# Patient Record
Sex: MALE | Race: WHITE | NOT HISPANIC OR LATINO | Employment: OTHER | URBAN - METROPOLITAN AREA
[De-identification: names, ages, dates, MRNs, and addresses within clinical notes are randomized per-mention and may not be internally consistent; named-entity substitution may affect disease eponyms.]

---

## 2023-07-24 ENCOUNTER — APPOINTMENT (OUTPATIENT)
Dept: PRIMARY CARE | Facility: CLINIC | Age: 68
End: 2023-07-24
Payer: COMMERCIAL

## 2023-08-10 DIAGNOSIS — E78.5 HYPERLIPIDEMIA, UNSPECIFIED HYPERLIPIDEMIA TYPE: ICD-10-CM

## 2023-08-10 DIAGNOSIS — I10 ESSENTIAL (PRIMARY) HYPERTENSION: ICD-10-CM

## 2023-08-10 RX ORDER — ROSUVASTATIN CALCIUM 40 MG/1
40 TABLET, COATED ORAL DAILY
COMMUNITY
End: 2023-08-10 | Stop reason: SDUPTHER

## 2023-08-10 RX ORDER — BENAZEPRIL HYDROCHLORIDE 20 MG/1
20 TABLET ORAL DAILY
Qty: 90 TABLET | Refills: 0 | Status: SHIPPED | OUTPATIENT
Start: 2023-08-10 | End: 2023-09-07 | Stop reason: SDUPTHER

## 2023-08-10 RX ORDER — ROSUVASTATIN CALCIUM 40 MG/1
40 TABLET, COATED ORAL DAILY
Qty: 90 TABLET | Refills: 0 | Status: SHIPPED | OUTPATIENT
Start: 2023-08-10 | End: 2024-01-02

## 2023-09-06 ENCOUNTER — TELEPHONE (OUTPATIENT)
Dept: PRIMARY CARE | Facility: CLINIC | Age: 68
End: 2023-09-06
Payer: COMMERCIAL

## 2023-09-06 DIAGNOSIS — I10 ESSENTIAL (PRIMARY) HYPERTENSION: ICD-10-CM

## 2023-09-07 RX ORDER — BENAZEPRIL HYDROCHLORIDE 20 MG/1
20 TABLET ORAL DAILY
Qty: 90 TABLET | Refills: 0 | Status: SHIPPED | OUTPATIENT
Start: 2023-09-07 | End: 2024-02-29

## 2023-09-16 LAB — URINE CULTURE: ABNORMAL

## 2023-12-13 ENCOUNTER — OFFICE VISIT (OUTPATIENT)
Dept: URGENT CARE | Facility: CLINIC | Age: 68
End: 2023-12-13
Payer: COMMERCIAL

## 2023-12-13 VITALS
TEMPERATURE: 97.7 F | HEART RATE: 78 BPM | BODY MASS INDEX: 25.1 KG/M2 | SYSTOLIC BLOOD PRESSURE: 168 MMHG | DIASTOLIC BLOOD PRESSURE: 78 MMHG | RESPIRATION RATE: 20 BRPM | OXYGEN SATURATION: 96 % | WEIGHT: 170 LBS

## 2023-12-13 DIAGNOSIS — S01.01XA SCALP LACERATION, INITIAL ENCOUNTER: Primary | ICD-10-CM

## 2023-12-13 PROCEDURE — 3077F SYST BP >= 140 MM HG: CPT | Performed by: PHYSICIAN ASSISTANT

## 2023-12-13 PROCEDURE — 99204 OFFICE O/P NEW MOD 45 MIN: CPT | Performed by: PHYSICIAN ASSISTANT

## 2023-12-13 PROCEDURE — 90471 IMMUNIZATION ADMIN: CPT | Performed by: PHYSICIAN ASSISTANT

## 2023-12-13 PROCEDURE — 3078F DIAST BP <80 MM HG: CPT | Performed by: PHYSICIAN ASSISTANT

## 2023-12-13 PROCEDURE — 90714 TD VACC NO PRESV 7 YRS+ IM: CPT | Performed by: PHYSICIAN ASSISTANT

## 2023-12-13 PROCEDURE — 1125F AMNT PAIN NOTED PAIN PRSNT: CPT | Performed by: PHYSICIAN ASSISTANT

## 2023-12-13 PROCEDURE — 1159F MED LIST DOCD IN RCRD: CPT | Performed by: PHYSICIAN ASSISTANT

## 2023-12-13 RX ORDER — TAMSULOSIN HYDROCHLORIDE 0.4 MG/1
0.4 CAPSULE ORAL 2 TIMES DAILY
COMMUNITY

## 2023-12-13 RX ORDER — CILOSTAZOL 100 MG/1
TABLET ORAL
COMMUNITY
Start: 2017-08-09

## 2023-12-13 RX ORDER — WARFARIN 7.5 MG/1
TABLET ORAL
COMMUNITY
Start: 2015-09-03

## 2023-12-13 ASSESSMENT — ENCOUNTER SYMPTOMS
ALLERGIC/IMMUNOLOGIC NEGATIVE: 1
HEADACHES: 0
ENDOCRINE NEGATIVE: 1
RESPIRATORY NEGATIVE: 1
CARDIOVASCULAR NEGATIVE: 1
WOUND: 1
PSYCHIATRIC NEGATIVE: 1
HEMATOLOGIC/LYMPHATIC NEGATIVE: 1
CONSTITUTIONAL NEGATIVE: 1
GASTROINTESTINAL NEGATIVE: 1
NECK PAIN: 0

## 2023-12-13 ASSESSMENT — PAIN SCALES - GENERAL: PAINLEVEL: 2

## 2023-12-14 NOTE — PROGRESS NOTES
Subjective   Patient ID: Vijay Juarez is a 68 y.o. male.      History provided by:  Patient and spouse   used: No    Laceration  This is a 68 yr old male here for scalp lacerations. Pt states under his desk tonight and started to get up hitting his head on sharp object under the desk. No LOC, vision change, neck pain or HA. On coumadin. Last tetanus unknown.      Review of Systems   Constitutional: Negative.    HENT: Negative.     Eyes:  Negative for visual disturbance.   Respiratory: Negative.     Cardiovascular: Negative.    Gastrointestinal: Negative.    Endocrine: Negative.    Genitourinary: Negative.    Musculoskeletal:  Negative for neck pain.   Skin:  Positive for wound.   Allergic/Immunologic: Negative.    Neurological:  Negative for headaches.   Hematological: Negative.    Psychiatric/Behavioral: Negative.     All other systems reviewed and are negative.    Past Medical History:   Diagnosis Date    Other pericardial effusion (noninflammatory) 11/18/2021    Pericardial effusion    Personal history of other diseases of male genital organs 11/18/2021    History of erectile dysfunction    Personal history of other diseases of the circulatory system 07/31/2019    History of lymphadenitis    Personal history of other diseases of the musculoskeletal system and connective tissue 07/23/2019    History of arthritis    Personal history of other diseases of the respiratory system 02/01/2021    History of allergic rhinitis    Personal history of other diseases of the respiratory system 03/02/2020    History of sore throat    Personal history of other diseases of urinary system 10/14/2019    History of hematuria    Personal history of other endocrine, nutritional and metabolic disease 03/02/2020    History of vitamin D deficiency    Personal history of other infectious and parasitic diseases 11/07/2022    History of candidiasis of mouth     Current Outpatient Medications on File Prior to Visit    Medication Sig Dispense Refill    cilostazol (Pletal) 100 mg tablet Take by mouth.      warfarin (Coumadin) 7.5 mg tablet Take by mouth.      Anoro Ellipta 62.5-25 mcg/actuation blister with device TAKE 1 PUFF BY MOUTH EVERY DAY 60 each 6    benazepril (Lotensin) 20 mg tablet Take 1 tablet (20 mg) by mouth once daily. 90 tablet 0    rosuvastatin (Crestor) 40 mg tablet Take 1 tablet (40 mg) by mouth once daily. 90 tablet 0    tamsulosin (Flomax) 0.4 mg 24 hr capsule Take 1 capsule (0.4 mg) by mouth 2 times a day.       No current facility-administered medications on file prior to visit.     No Known Allergies  /78   Pulse 78   Temp 36.5 °C (97.7 °F)   Resp 20   Wt 77.1 kg (170 lb)   SpO2 96%   BMI 25.10 kg/m²   Objective   Physical Exam  Vitals and nursing note reviewed.   Constitutional:       Appearance: Normal appearance.   HENT:      Head: Normocephalic.      Comments: 2 superficial flap like lacerations top of scalp. No deep lacerations  Cardiovascular:      Rate and Rhythm: Normal rate and regular rhythm.   Pulmonary:      Effort: Pulmonary effort is normal.      Breath sounds: Normal breath sounds.   Skin:     General: Skin is warm and dry.   Neurological:      General: No focal deficit present.      Mental Status: He is alert and oriented to person, place, and time.   Psychiatric:         Mood and Affect: Mood normal.         Behavior: Behavior normal.     Procedure scalp laceration repair- wounds cleansed with betadine and sterile saline. Dermabond used to approximate wound edges. Hemostasis achieved. Pt tolerated procedure well.    Assessment:  Scalp laceration    Plan:  Td updated today  Keep wound clean and dry  No soap, water or antibiotic cream on the wounds  Dermabond will slough off on its own over the next week  Pcp follow up this week if not improving or worsening  ER visit any time 24/7 for acute worsening or changing condition

## 2023-12-14 NOTE — PATIENT INSTRUCTIONS
Keep wound site clean and dry  No soap, water or antibiotic cream on the wound site.   The dermabond will slough off on its own over the next week  Tylenol as directed for pain  Pcp follow up this week if not improving or worsening  ER visit anytime 24/7 for acute worsening or changing condition

## 2023-12-19 ENCOUNTER — OFFICE VISIT (OUTPATIENT)
Dept: PRIMARY CARE | Facility: CLINIC | Age: 68
End: 2023-12-19
Payer: COMMERCIAL

## 2023-12-19 ENCOUNTER — LAB (OUTPATIENT)
Dept: LAB | Facility: LAB | Age: 68
End: 2023-12-19
Payer: COMMERCIAL

## 2023-12-19 VITALS
SYSTOLIC BLOOD PRESSURE: 126 MMHG | TEMPERATURE: 97.3 F | BODY MASS INDEX: 24.68 KG/M2 | WEIGHT: 166.6 LBS | OXYGEN SATURATION: 97 % | HEART RATE: 93 BPM | DIASTOLIC BLOOD PRESSURE: 72 MMHG | HEIGHT: 69 IN

## 2023-12-19 DIAGNOSIS — R73.9 HYPERGLYCEMIA: ICD-10-CM

## 2023-12-19 DIAGNOSIS — E78.2 MIXED HYPERLIPIDEMIA: ICD-10-CM

## 2023-12-19 DIAGNOSIS — Z13.6 SCREENING FOR ABDOMINAL AORTIC ANEURYSM: ICD-10-CM

## 2023-12-19 DIAGNOSIS — I10 HYPERTENSION, UNSPECIFIED TYPE: ICD-10-CM

## 2023-12-19 DIAGNOSIS — S09.90XD INJURY OF HEAD, SUBSEQUENT ENCOUNTER: ICD-10-CM

## 2023-12-19 DIAGNOSIS — I10 BENIGN ESSENTIAL HYPERTENSION: ICD-10-CM

## 2023-12-19 DIAGNOSIS — I73.9 PAD (PERIPHERAL ARTERY DISEASE) (CMS-HCC): ICD-10-CM

## 2023-12-19 DIAGNOSIS — Z12.2 ENCOUNTER FOR SCREENING FOR LUNG CANCER: ICD-10-CM

## 2023-12-19 DIAGNOSIS — Z00.00 MEDICARE ANNUAL WELLNESS VISIT, SUBSEQUENT: ICD-10-CM

## 2023-12-19 DIAGNOSIS — N40.0 BENIGN PROSTATIC HYPERPLASIA WITHOUT LOWER URINARY TRACT SYMPTOMS: ICD-10-CM

## 2023-12-19 DIAGNOSIS — Z00.00 MEDICARE ANNUAL WELLNESS VISIT, SUBSEQUENT: Primary | ICD-10-CM

## 2023-12-19 DIAGNOSIS — F17.200 SMOKER: ICD-10-CM

## 2023-12-19 DIAGNOSIS — Z87.891 EX-CIGARETTE SMOKER: ICD-10-CM

## 2023-12-19 PROBLEM — S09.90XA HEAD INJURY: Status: ACTIVE | Noted: 2023-12-19

## 2023-12-19 PROCEDURE — 82043 UR ALBUMIN QUANTITATIVE: CPT

## 2023-12-19 PROCEDURE — 1125F AMNT PAIN NOTED PAIN PRSNT: CPT | Performed by: INTERNAL MEDICINE

## 2023-12-19 PROCEDURE — 1170F FXNL STATUS ASSESSED: CPT | Performed by: INTERNAL MEDICINE

## 2023-12-19 PROCEDURE — 3078F DIAST BP <80 MM HG: CPT | Performed by: INTERNAL MEDICINE

## 2023-12-19 PROCEDURE — 83036 HEMOGLOBIN GLYCOSYLATED A1C: CPT

## 2023-12-19 PROCEDURE — G0439 PPPS, SUBSEQ VISIT: HCPCS | Performed by: INTERNAL MEDICINE

## 2023-12-19 PROCEDURE — 84153 ASSAY OF PSA TOTAL: CPT

## 2023-12-19 PROCEDURE — 99497 ADVNCD CARE PLAN 30 MIN: CPT | Performed by: INTERNAL MEDICINE

## 2023-12-19 PROCEDURE — 1160F RVW MEDS BY RX/DR IN RCRD: CPT | Performed by: INTERNAL MEDICINE

## 2023-12-19 PROCEDURE — 84443 ASSAY THYROID STIM HORMONE: CPT

## 2023-12-19 PROCEDURE — 86803 HEPATITIS C AB TEST: CPT

## 2023-12-19 PROCEDURE — 81001 URINALYSIS AUTO W/SCOPE: CPT

## 2023-12-19 PROCEDURE — 3074F SYST BP LT 130 MM HG: CPT | Performed by: INTERNAL MEDICINE

## 2023-12-19 PROCEDURE — 80061 LIPID PANEL: CPT

## 2023-12-19 PROCEDURE — 85025 COMPLETE CBC W/AUTO DIFF WBC: CPT

## 2023-12-19 PROCEDURE — 84439 ASSAY OF FREE THYROXINE: CPT

## 2023-12-19 PROCEDURE — 80053 COMPREHEN METABOLIC PANEL: CPT

## 2023-12-19 PROCEDURE — 84550 ASSAY OF BLOOD/URIC ACID: CPT

## 2023-12-19 PROCEDURE — 1159F MED LIST DOCD IN RCRD: CPT | Performed by: INTERNAL MEDICINE

## 2023-12-19 PROCEDURE — G0296 VISIT TO DETERM LDCT ELIG: HCPCS | Performed by: INTERNAL MEDICINE

## 2023-12-19 PROCEDURE — 82570 ASSAY OF URINE CREATININE: CPT

## 2023-12-19 PROCEDURE — 36415 COLL VENOUS BLD VENIPUNCTURE: CPT

## 2023-12-19 ASSESSMENT — ACTIVITIES OF DAILY LIVING (ADL)
BATHING: INDEPENDENT
DRESSING: INDEPENDENT
TAKING_MEDICATION: INDEPENDENT
GROCERY_SHOPPING: INDEPENDENT
DOING_HOUSEWORK: INDEPENDENT
MANAGING_FINANCES: INDEPENDENT

## 2023-12-19 ASSESSMENT — PATIENT HEALTH QUESTIONNAIRE - PHQ9
SUM OF ALL RESPONSES TO PHQ9 QUESTIONS 1 AND 2: 0
2. FEELING DOWN, DEPRESSED OR HOPELESS: NOT AT ALL
2. FEELING DOWN, DEPRESSED OR HOPELESS: NOT AT ALL
SUM OF ALL RESPONSES TO PHQ9 QUESTIONS 1 AND 2: 0
1. LITTLE INTEREST OR PLEASURE IN DOING THINGS: NOT AT ALL
1. LITTLE INTEREST OR PLEASURE IN DOING THINGS: NOT AT ALL

## 2023-12-19 NOTE — PROGRESS NOTES
Assessment and Plan:  Problem List Items Addressed This Visit       Hypertension    Relevant Orders    Albumin , Urine Random    CBC and Auto Differential    Comprehensive Metabolic Panel    Hemoglobin A1C    Lipid Panel    Prostate Specific Antigen, Screen    TSH with reflex to Free T4 if abnormal    Uric Acid    Microscopic Only, Urine    CT cardiac scoring wo IV contrast    Colonoscopy Screening; Average Risk Patient    Hepatitis C antibody    Aorta iliac duplex limited    Hyperlipidemia    Relevant Orders    Albumin , Urine Random    CBC and Auto Differential    Comprehensive Metabolic Panel    Hemoglobin A1C    Lipid Panel    Prostate Specific Antigen, Screen    TSH with reflex to Free T4 if abnormal    Uric Acid    Microscopic Only, Urine    Colonoscopy Screening; Average Risk Patient    Hepatitis C antibody    Aorta iliac duplex limited    Medicare annual wellness visit, subsequent - Primary    Relevant Orders    Albumin , Urine Random    CBC and Auto Differential    Comprehensive Metabolic Panel    Hemoglobin A1C    Lipid Panel    Prostate Specific Antigen, Screen    TSH with reflex to Free T4 if abnormal    Uric Acid    Microscopic Only, Urine    Colonoscopy Screening; Average Risk Patient    Hepatitis C antibody    Aorta iliac duplex limited    PAD (peripheral artery disease) (CMS/HCC)    Relevant Orders    Albumin , Urine Random    CBC and Auto Differential    Comprehensive Metabolic Panel    Hemoglobin A1C    Lipid Panel    Prostate Specific Antigen, Screen    TSH with reflex to Free T4 if abnormal    Uric Acid    Microscopic Only, Urine    Colonoscopy Screening; Average Risk Patient    Hepatitis C antibody    Aorta iliac duplex limited    Smoker    Relevant Orders    Albumin , Urine Random    CBC and Auto Differential    Comprehensive Metabolic Panel    Hemoglobin A1C    Lipid Panel    Prostate Specific Antigen, Screen    TSH with reflex to Free T4 if abnormal    Uric Acid    Microscopic Only, Urine     Colonoscopy Screening; Average Risk Patient    Hepatitis C antibody    Aorta iliac duplex limited    Head injury    Relevant Orders    Albumin , Urine Random    CBC and Auto Differential    Comprehensive Metabolic Panel    Hemoglobin A1C    Lipid Panel    Prostate Specific Antigen, Screen    TSH with reflex to Free T4 if abnormal    Uric Acid    Microscopic Only, Urine    Colonoscopy Screening; Average Risk Patient    Hepatitis C antibody    Aorta iliac duplex limited     Other Visit Diagnoses       Ex-cigarette smoker        Relevant Orders    CT lung screening low dose    Hyperglycemia        Relevant Orders    Hemoglobin A1C    Benign prostatic hyperplasia without lower urinary tract symptoms        Relevant Orders    Prostate Specific Antigen, Screen              Chief Complaint:   Annual Medicare Wellness Office Exam/Comprehensive Problem Focused Follow Up and Physical Exam      HPI: 68-year-old patient  with no children    No siblings    Mother  from heart problem Father  from old age    History of the cataract surgery peripheral arterial disease left lower extremity bypass surgery on anticoagulation    History of hypertension hyperlipidemia BPH smoker induced bronchitis and PAD CAD    Negative for hemoptysis hematuria rectal bleeding    Do have some claudication seen by vascular    Negative for weight loss    Recent history of the head injury excellently went to urgent care had a glue placement no fever no chills no positive chills no headache no dizziness no seizure    Patient Active Problem List:  Patient Active Problem List   Diagnosis    Hypertension    Hyperlipidemia    Medicare annual wellness visit, subsequent    PAD (peripheral artery disease) (CMS/[facility])    Smoker    Head injury          Comprehensive Medical/Surgical/Social/Family History:  Family History   Problem Relation Name Age of Onset    Heart disease Mother      Other (back issues) Mother      No Known Problems Father          Past Medical History:   Diagnosis Date    Other pericardial effusion (noninflammatory) 11/18/2021    Pericardial effusion    Personal history of other diseases of male genital organs 11/18/2021    History of erectile dysfunction    Personal history of other diseases of the circulatory system 07/31/2019    History of lymphadenitis    Personal history of other diseases of the musculoskeletal system and connective tissue 07/23/2019    History of arthritis    Personal history of other diseases of the respiratory system 02/01/2021    History of allergic rhinitis    Personal history of other diseases of the respiratory system 03/02/2020    History of sore throat    Personal history of other diseases of urinary system 10/14/2019    History of hematuria    Personal history of other endocrine, nutritional and metabolic disease 03/02/2020    History of vitamin D deficiency    Personal history of other infectious and parasitic diseases 11/07/2022    History of candidiasis of mouth       Past Surgical History:   Procedure Laterality Date    FINGER SURGERY      LEG SURGERY         Social History     Socioeconomic History    Marital status:      Spouse name: None    Number of children: None    Years of education: None    Highest education level: None   Occupational History    None   Tobacco Use    Smoking status: Some Days     Packs/day: 1.00     Years: 45.00     Additional pack years: 0.00     Total pack years: 45.00     Types: Cigars, Cigarettes    Smokeless tobacco: Never   Substance and Sexual Activity    Alcohol use: Not Currently     Alcohol/week: 0.0 - 1.0 standard drinks of alcohol    Drug use: Never    Sexual activity: None   Other Topics Concern    None   Social History Narrative    None     Social Determinants of Health     Financial Resource Strain: Not on file   Food Insecurity: Not on file   Transportation Needs: Not on file   Physical Activity: Not on file   Stress: Not on file   Social Connections:  Not on file   Intimate Partner Violence: Not on file   Housing Stability: Not on file       Tobacco/Alcohol/Opioid use, as well as Illicit Drug Use was screened for/reviewed and documented in Social Documentation section of the chart and medication list as appropriate    Allergies and Medications  No Known Allergies  Current Outpatient Medications   Medication Sig Dispense Refill    Anoro Ellipta 62.5-25 mcg/actuation blister with device TAKE 1 PUFF BY MOUTH EVERY DAY 60 each 6    benazepril (Lotensin) 20 mg tablet Take 1 tablet (20 mg) by mouth once daily. 90 tablet 0    cilostazol (Pletal) 100 mg tablet Take by mouth.      rosuvastatin (Crestor) 40 mg tablet Take 1 tablet (40 mg) by mouth once daily. 90 tablet 0    tamsulosin (Flomax) 0.4 mg 24 hr capsule Take 1 capsule (0.4 mg) by mouth 2 times a day.      warfarin (Coumadin) 7.5 mg tablet Take by mouth.       No current facility-administered medications for this visit.       Medications and Supplements  prescribed by me and other practitioners or clinical pharmacist (such as prescriptions, OTC's, herbal therapies and supplements) were reviewed and documented in the medical record.     Advance directives  Advanced Care Planning (including a Living Will, Healthcare POA, as well as specific end of life choices and/or directives), was discussed for approximately 16 minutes with the patient and/or surrogate, voluntarily, and documented in the Problem List of the medical record.     Cardiac Risk Assessment  Cardiovascular risk was discussed and, if needed, lifestyle modifications recommended, including nutritional choices, exercise, and elimination of habits contributing to risk. We agreed on a plan to reduce the current cardiovascular risk based on above discussion as needed.  Aspirin use/disuse was discussed and documented in the Problem List of the medical record after reviewing the updated guidelines below:    Consider low dose Aspirin ( mg) use if the  "benefit for cardiovascular disease prevention outweighs risk for bleeding complications.   In general, low dose ASA should be considered:  In patients WITHOUT prior MI/stroke/PAD (primary prevention):   a. Age <60: Use if 10-year cardiovascular disease risk >20%, with discussion of risks and benefits with patient  b. Age 60-<70: Use if 10-year cardiovascular disease risk >20% and low bleeding (e.g., gastrointenstinal) risk, with discussion of risks and benefits with patient  c. Age >=70: Do not use    In patients WITH prior MI/stroke/PAD (secondary prevention):   Generally use unless extremely high bleeding (e.g., gastrointenstinal) risk, with discussion of risks and benefits with patient    ROS otherwise negative aside from what was mentioned above in HPI.    Visit Vitals  /72 (BP Location: Left arm, Patient Position: Sitting, BP Cuff Size: Adult)   Pulse 93   Temp 36.3 °C (97.3 °F)   Ht 1.753 m (5' 9\")   Wt 75.6 kg (166 lb 9.6 oz)   SpO2 97%   BMI 24.60 kg/m²   Smoking Status Some Days   BSA 1.92 m²       Physical Exam  Physical Exam:    Appearance: Alert, oriented , cooperative,  in no acute distress. Well nourished & well hydrated.    Skin: Posttraumatic head injury to the parietal frontal area healing    Eyes: PERRLA, EOMs intact,  Conjunctiva pink with no redness or exudates. Cornea & anterior chamber are clear, Eyelids without lesions. No scleral icterus.     ENT: Minimal wax.     Neck: Supple, without meningismus. Thyroid not palpable. Trachea at midline. No lymphadenopathy.    Pulmonary: Crackles rhonchi. No accessory muscle use or stridor.    Cardiac: Heart murmur.    Abdomen: Soft, nontender, active bowel sounds.  No palpable organomegaly.  No rebound or guarding.  No CVA tenderness.    Genitourinary: Exam deferred.    Musculoskeletal: Arthritis   neurological: Left lower extremity neurology    Psychiatric: Appropriate mood and affect.         During the course of the visit the patient was educated " and counseled about age appropriate screening and preventive services. Completed preventive screenings were documented in the chart and orders were placed for outstanding screenings/procedures as documented in the Assessment and Plan.    Patient Instructions (the written plan) was given to the patient at check out.    Charting was completed using voice recognition technology and may include unintended errors.

## 2023-12-20 LAB
ALBUMIN SERPL BCP-MCNC: 4.1 G/DL (ref 3.4–5)
ALP SERPL-CCNC: 73 U/L (ref 33–136)
ALT SERPL W P-5'-P-CCNC: 37 U/L (ref 10–52)
ANION GAP SERPL CALC-SCNC: 15 MMOL/L (ref 10–20)
AST SERPL W P-5'-P-CCNC: 20 U/L (ref 9–39)
BASOPHILS # BLD AUTO: 0.12 X10*3/UL (ref 0–0.1)
BASOPHILS NFR BLD AUTO: 0.8 %
BILIRUB SERPL-MCNC: 0.4 MG/DL (ref 0–1.2)
BUN SERPL-MCNC: 24 MG/DL (ref 6–23)
CALCIUM SERPL-MCNC: 10.2 MG/DL (ref 8.6–10.6)
CAOX CRY #/AREA UR COMP ASSIST: ABNORMAL /HPF
CHLORIDE SERPL-SCNC: 103 MMOL/L (ref 98–107)
CHOLEST SERPL-MCNC: 119 MG/DL (ref 0–199)
CHOLESTEROL/HDL RATIO: 3.4
CO2 SERPL-SCNC: 26 MMOL/L (ref 21–32)
CREAT SERPL-MCNC: 1.11 MG/DL (ref 0.5–1.3)
CREAT UR-MCNC: 92.3 MG/DL (ref 20–370)
EOSINOPHIL # BLD AUTO: 0.34 X10*3/UL (ref 0–0.7)
EOSINOPHIL NFR BLD AUTO: 2.4 %
ERYTHROCYTE [DISTWIDTH] IN BLOOD BY AUTOMATED COUNT: 14 % (ref 11.5–14.5)
EST. AVERAGE GLUCOSE BLD GHB EST-MCNC: 163 MG/DL
GFR SERPL CREATININE-BSD FRML MDRD: 72 ML/MIN/1.73M*2
GLUCOSE SERPL-MCNC: 110 MG/DL (ref 74–99)
HBA1C MFR BLD: 7.3 %
HCT VFR BLD AUTO: 43.6 % (ref 41–52)
HCV AB SER QL: NONREACTIVE
HDLC SERPL-MCNC: 34.7 MG/DL
HGB BLD-MCNC: 14.2 G/DL (ref 13.5–17.5)
IMM GRANULOCYTES # BLD AUTO: 0.09 X10*3/UL (ref 0–0.7)
IMM GRANULOCYTES NFR BLD AUTO: 0.6 % (ref 0–0.9)
LDLC SERPL CALC-MCNC: 39 MG/DL
LYMPHOCYTES # BLD AUTO: 3.99 X10*3/UL (ref 1.2–4.8)
LYMPHOCYTES NFR BLD AUTO: 27.9 %
MCH RBC QN AUTO: 32.3 PG (ref 26–34)
MCHC RBC AUTO-ENTMCNC: 32.6 G/DL (ref 32–36)
MCV RBC AUTO: 99 FL (ref 80–100)
MICROALBUMIN UR-MCNC: 18.5 MG/L
MICROALBUMIN/CREAT UR: 20 UG/MG CREAT
MONOCYTES # BLD AUTO: 1.19 X10*3/UL (ref 0.1–1)
MONOCYTES NFR BLD AUTO: 8.3 %
NEUTROPHILS # BLD AUTO: 8.59 X10*3/UL (ref 1.2–7.7)
NEUTROPHILS NFR BLD AUTO: 60 %
NON HDL CHOLESTEROL: 84 MG/DL (ref 0–149)
NRBC BLD-RTO: 0 /100 WBCS (ref 0–0)
PLATELET # BLD AUTO: 305 X10*3/UL (ref 150–450)
POTASSIUM SERPL-SCNC: 4.7 MMOL/L (ref 3.5–5.3)
PROT SERPL-MCNC: 7.2 G/DL (ref 6.4–8.2)
PSA SERPL-MCNC: 1.06 NG/ML
RBC # BLD AUTO: 4.39 X10*6/UL (ref 4.5–5.9)
RBC #/AREA URNS AUTO: ABNORMAL /HPF
SODIUM SERPL-SCNC: 139 MMOL/L (ref 136–145)
T4 FREE SERPL-MCNC: 0.95 NG/DL (ref 0.78–1.48)
TRIGL SERPL-MCNC: 227 MG/DL (ref 0–149)
TSH SERPL-ACNC: 6.98 MIU/L (ref 0.44–3.98)
URATE SERPL-MCNC: 5.7 MG/DL (ref 4–7.5)
VLDL: 45 MG/DL (ref 0–40)
WBC # BLD AUTO: 14.3 X10*3/UL (ref 4.4–11.3)
WBC #/AREA URNS AUTO: ABNORMAL /HPF

## 2023-12-21 ENCOUNTER — TELEPHONE (OUTPATIENT)
Dept: PRIMARY CARE | Facility: CLINIC | Age: 68
End: 2023-12-21
Payer: COMMERCIAL

## 2023-12-21 NOTE — TELEPHONE ENCOUNTER
----- Message from Pham Damian MD sent at 12/21/2023 10:32 AM EST -----  Urine positive for calcium oxalate    High triglyceride    Hyperglycemia    High TSH hemoglobin A1c was high multiple abnormal test follow-up 4 weeks bring all medication with you to discuss

## 2023-12-31 DIAGNOSIS — E78.5 HYPERLIPIDEMIA, UNSPECIFIED HYPERLIPIDEMIA TYPE: ICD-10-CM

## 2024-01-02 RX ORDER — ROSUVASTATIN CALCIUM 40 MG/1
40 TABLET, COATED ORAL DAILY
Qty: 90 TABLET | Refills: 0 | Status: SHIPPED | OUTPATIENT
Start: 2024-01-02 | End: 2024-01-30

## 2024-01-22 ENCOUNTER — OFFICE VISIT (OUTPATIENT)
Dept: PRIMARY CARE | Facility: CLINIC | Age: 69
End: 2024-01-22
Payer: COMMERCIAL

## 2024-01-22 VITALS
TEMPERATURE: 97.2 F | HEIGHT: 69 IN | SYSTOLIC BLOOD PRESSURE: 114 MMHG | WEIGHT: 164.4 LBS | DIASTOLIC BLOOD PRESSURE: 62 MMHG | OXYGEN SATURATION: 97 % | BODY MASS INDEX: 24.35 KG/M2 | HEART RATE: 87 BPM

## 2024-01-22 DIAGNOSIS — E11.49 DIABETES MELLITUS TYPE 2 WITH NEUROLOGICAL MANIFESTATIONS (MULTI): Primary | ICD-10-CM

## 2024-01-22 DIAGNOSIS — E03.9 ACQUIRED HYPOTHYROIDISM: ICD-10-CM

## 2024-01-22 DIAGNOSIS — E78.2 DM TYPE 2 WITH DIABETIC MIXED HYPERLIPIDEMIA (MULTI): ICD-10-CM

## 2024-01-22 DIAGNOSIS — J44.9 MILD CHRONIC OBSTRUCTIVE PULMONARY DISEASE (MULTI): ICD-10-CM

## 2024-01-22 DIAGNOSIS — E11.9 DIABETES MELLITUS WITH COINCIDENT HYPERTENSION (MULTI): ICD-10-CM

## 2024-01-22 DIAGNOSIS — I10 DIABETES MELLITUS WITH COINCIDENT HYPERTENSION (MULTI): ICD-10-CM

## 2024-01-22 DIAGNOSIS — E11.69 DM TYPE 2 WITH DIABETIC MIXED HYPERLIPIDEMIA (MULTI): ICD-10-CM

## 2024-01-22 DIAGNOSIS — I73.9 PAD (PERIPHERAL ARTERY DISEASE) (CMS-HCC): ICD-10-CM

## 2024-01-22 PROBLEM — S09.90XA HEAD INJURY: Status: RESOLVED | Noted: 2023-12-19 | Resolved: 2024-01-22

## 2024-01-22 PROBLEM — E78.5 HYPERLIPIDEMIA: Status: RESOLVED | Noted: 2023-08-10 | Resolved: 2024-01-22

## 2024-01-22 PROBLEM — Z00.00 MEDICARE ANNUAL WELLNESS VISIT, SUBSEQUENT: Status: RESOLVED | Noted: 2023-12-19 | Resolved: 2024-01-22

## 2024-01-22 PROCEDURE — 1159F MED LIST DOCD IN RCRD: CPT | Performed by: INTERNAL MEDICINE

## 2024-01-22 PROCEDURE — 4010F ACE/ARB THERAPY RXD/TAKEN: CPT | Performed by: INTERNAL MEDICINE

## 2024-01-22 PROCEDURE — 3074F SYST BP LT 130 MM HG: CPT | Performed by: INTERNAL MEDICINE

## 2024-01-22 PROCEDURE — 2028F FOOT EXAM PERFORMED: CPT | Performed by: INTERNAL MEDICINE

## 2024-01-22 PROCEDURE — 3078F DIAST BP <80 MM HG: CPT | Performed by: INTERNAL MEDICINE

## 2024-01-22 PROCEDURE — 1125F AMNT PAIN NOTED PAIN PRSNT: CPT | Performed by: INTERNAL MEDICINE

## 2024-01-22 PROCEDURE — 99214 OFFICE O/P EST MOD 30 MIN: CPT | Performed by: INTERNAL MEDICINE

## 2024-01-22 PROCEDURE — 1160F RVW MEDS BY RX/DR IN RCRD: CPT | Performed by: INTERNAL MEDICINE

## 2024-01-22 RX ORDER — WARFARIN SODIUM 5 MG/1
TABLET ORAL
COMMUNITY
Start: 2023-12-16

## 2024-01-22 RX ORDER — FINASTERIDE 5 MG/1
5 TABLET, FILM COATED ORAL DAILY
COMMUNITY
Start: 2024-01-07

## 2024-01-22 RX ORDER — LEVOTHYROXINE SODIUM 25 UG/1
12.5 TABLET ORAL DAILY
Qty: 45 TABLET | Refills: 1 | Status: SHIPPED | OUTPATIENT
Start: 2024-01-22 | End: 2024-03-19 | Stop reason: SDUPTHER

## 2024-01-22 RX ORDER — METFORMIN HYDROCHLORIDE 500 MG/1
500 TABLET ORAL
Qty: 180 TABLET | Refills: 1 | Status: SHIPPED | OUTPATIENT
Start: 2024-01-22 | End: 2024-03-19 | Stop reason: SDUPTHER

## 2024-01-22 NOTE — PROGRESS NOTES
Subjective   Patient ID: Vijay Juarez is a 68 y.o. male who presents for Follow-up (Go over blood work).    Assessment/Plan   New onset type 2 diabetes with complication hypertension hyperlipidemia proteinuria    Metformin 500 twice a day refer to podiatry ophthalmology dietitian    Diabetes with hypertension Lotensin 20 mg a day BMP twice a year    Diabetes with hyperlipidemia Crestor 40 mg a day monitor LFT lipid CPK once a year    Peripheral artery disease seen by vascular continue Coumadin    COPD advised of smoking continue the Anoro    BPH continue the Proscar and Flomax check PSA    PAD continue Pletal watch for bleeding    Follow-up physical in April refer patient to Dr. Hassan for Tylenol examination advised to get COVID-19 and Tdap vaccine follow-up  Problem List Items Addressed This Visit       PAD (peripheral artery disease) (CMS/HCC)    Mild chronic obstructive pulmonary disease (CMS/HCC)    Diabetes mellitus type 2 with neurological manifestations (CMS/HCC) - Primary    Relevant Orders    Albumin , Urine Random    Hemoglobin A1C    Lipid Panel    TSH with reflex to Free T4 if abnormal    CK    Diabetes mellitus with coincident hypertension (CMS/HCC)     Patients BP readings reviewed and addressed, as we age our arteries turn stiffer and less elastic. Restricting salt consumption and staying physically fit with regular exercise regimen is the only way to keep our vasculature less tonic. Studies have shown that keeping ideal body wt, exercise routine about 140 to 150 minutes a week, eating variety of plant based diet and drinking plentiful water are quite helpful. Monitor BP twice or once a week at home and bring log to be reviewed by me. Uncontrolled BP has long term consequences including heart failure, myocardial infarction, accelerated atherosclerosis and kidney dysfunction. Therapy reviewed and explained.           Relevant Orders    Albumin , Urine Random    Hemoglobin A1C    Lipid Panel    TSH with  reflex to Free T4 if abnormal    CK    DM type 2 with diabetic mixed hyperlipidemia (CMS/HCC)     Diabetes is chronic disease, it does not get cured but it can be controlled, in modern medicine there are variety of measures taken to control DM. Usually we want to preserve beta cell functions. Please understand the disease and how our life style can affect control of glycemia. Diabetes leads to macro and microvascular complications and they could be devastating. It is important to have annual eye check and frequent foot inspection and foot inspection. Kidney dysfunction including dialysis, foot amputations, neuropathy, foot ulcers and accelerated atherosclerotic vascular disease are known complications of uncontrolled DM, pt was educated and explained.           Relevant Orders    Albumin , Urine Random    Hemoglobin A1C    Lipid Panel    TSH with reflex to Free T4 if abnormal    CK    Acquired hypothyroidism       HPI this is a 68-year-old patient smoking due COPD hypertension hyperlipidemia PAD diabetes with complication complaining arthralgia myalgia fatigue tired weakness    Negative for headache chest pain hematuria bleeding PND orthopnea    Negative for fall    Family history positive for hypertension hyperlipidemia    Negative for prostate colon cancer or diabetes.  Past Medical History:   Diagnosis Date    Other pericardial effusion (noninflammatory) 11/18/2021    Pericardial effusion    Personal history of other diseases of male genital organs 11/18/2021    History of erectile dysfunction    Personal history of other diseases of the circulatory system 07/31/2019    History of lymphadenitis    Personal history of other diseases of the musculoskeletal system and connective tissue 07/23/2019    History of arthritis    Personal history of other diseases of the respiratory system 02/01/2021    History of allergic rhinitis    Personal history of other diseases of the respiratory system 03/02/2020    History of  sore throat    Personal history of other diseases of urinary system 10/14/2019    History of hematuria    Personal history of other endocrine, nutritional and metabolic disease 03/02/2020    History of vitamin D deficiency    Personal history of other infectious and parasitic diseases 11/07/2022    History of candidiasis of mouth     Past Surgical History:   Procedure Laterality Date    FINGER SURGERY      LEG SURGERY       No Known Allergies  Current Outpatient Medications   Medication Sig Dispense Refill    Anoro Ellipta 62.5-25 mcg/actuation blister with device TAKE 1 PUFF BY MOUTH EVERY DAY 60 each 6    benazepril (Lotensin) 20 mg tablet Take 1 tablet (20 mg) by mouth once daily. 90 tablet 0    cilostazol (Pletal) 100 mg tablet Take by mouth.      finasteride (Proscar) 5 mg tablet Take 1 tablet (5 mg) by mouth once daily.      rosuvastatin (Crestor) 40 mg tablet TAKE 1 TABLET BY MOUTH EVERY DAY 90 tablet 0    tamsulosin (Flomax) 0.4 mg 24 hr capsule Take 1 capsule (0.4 mg) by mouth 2 times a day.      warfarin (Coumadin) 5 mg tablet TAKE AS DIRECTED BY COUMADIN CLINIC      warfarin (Coumadin) 7.5 mg tablet Take by mouth.       No current facility-administered medications for this visit.     Family History   Problem Relation Name Age of Onset    Heart disease Mother      Other (back issues) Mother      No Known Problems Father       Social History     Socioeconomic History    Marital status:      Spouse name: None    Number of children: None    Years of education: None    Highest education level: None   Occupational History    None   Tobacco Use    Smoking status: Some Days     Packs/day: 1.00     Years: 45.00     Additional pack years: 0.00     Total pack years: 45.00     Types: Cigars, Cigarettes    Smokeless tobacco: Never   Substance and Sexual Activity    Alcohol use: Not Currently     Alcohol/week: 0.0 - 1.0 standard drinks of alcohol    Drug use: Never    Sexual activity: None   Other Topics Concern  "   None   Social History Narrative    None     Social Determinants of Health     Financial Resource Strain: Not on file   Food Insecurity: Not on file   Transportation Needs: Not on file   Physical Activity: Not on file   Stress: Not on file   Social Connections: Not on file   Intimate Partner Violence: Not on file   Housing Stability: Not on file     Immunization History   Administered Date(s) Administered    Flu vaccine, quadrivalent, high-dose, preservative free, age 65y+ (FLUZONE) 11/06/2020    Influenza, Unspecified 11/06/2020    Pfizer Purple Cap SARS-CoV-2 03/25/2021, 04/15/2021, 11/26/2021    Td vaccine, age 7 years and older (TDVAX) 12/13/2023    Zoster vaccine, recombinant, adult (SHINGRIX) 02/01/2021, 05/06/2021       Review of Systems  Review of systems is otherwise negative unless stated above or in history of present illness.    Objective   Visit Vitals  /62 (BP Location: Left arm, Patient Position: Sitting, BP Cuff Size: Adult)   Pulse 87   Temp 36.2 °C (97.2 °F)   Ht 1.753 m (5' 9\")   Wt 74.6 kg (164 lb 6.4 oz)   SpO2 97%   BMI 24.28 kg/m²   Smoking Status Some Days   BSA 1.91 m²     Physical Exam  Constitutional:       General: not in acute distress.   HENT: Sinus congestion     Head: Normocephalic and atraumatic.      Nose: Nose normal.   Eyes:      Extraocular Movements: Extraocular movements intact.      Conjunctiva/sclera: Conjunctivae normal.   Cardiovascular: Heart murmur     Rate and Rhythm: Normal rate ,  No M/R/G  Pulmonary: Expiratory rhonchi     Effort: Pulmonary effort is normal.      Breath sounds: Normal, Bilat Equal AE  Skin:     General: Skin is warm.   Neurological: Negative for tingling numbness diabetic foot examination normal     Mental Status: He is alert and oriented to person, place, and time.   Psychiatric:         Mood and Affect: Mood normal.         Behavior: Behavior normal.   Musculoskeletal   FROM in all extremitirs,  Joint-no swelling or tenderness    Lab on " 12/19/2023   Component Date Value Ref Range Status    Albumin, Urine Random 12/19/2023 18.5  Not established mg/L Final    Creatinine, Urine Random 12/19/2023 92.3  20.0 - 370.0 mg/dL Final    Albumin/Creatine Ratio 12/19/2023 20.0  <30.0 ug/mg Creat Final    WBC 12/19/2023 14.3 (H)  4.4 - 11.3 x10*3/uL Final    nRBC 12/19/2023 0.0  0.0 - 0.0 /100 WBCs Final    RBC 12/19/2023 4.39 (L)  4.50 - 5.90 x10*6/uL Final    Hemoglobin 12/19/2023 14.2  13.5 - 17.5 g/dL Final    Hematocrit 12/19/2023 43.6  41.0 - 52.0 % Final    MCV 12/19/2023 99  80 - 100 fL Final    MCH 12/19/2023 32.3  26.0 - 34.0 pg Final    MCHC 12/19/2023 32.6  32.0 - 36.0 g/dL Final    RDW 12/19/2023 14.0  11.5 - 14.5 % Final    Platelets 12/19/2023 305  150 - 450 x10*3/uL Final    Neutrophils % 12/19/2023 60.0  40.0 - 80.0 % Final    Immature Granulocytes %, Automated 12/19/2023 0.6  0.0 - 0.9 % Final    Lymphocytes % 12/19/2023 27.9  13.0 - 44.0 % Final    Monocytes % 12/19/2023 8.3  2.0 - 10.0 % Final    Eosinophils % 12/19/2023 2.4  0.0 - 6.0 % Final    Basophils % 12/19/2023 0.8  0.0 - 2.0 % Final    Neutrophils Absolute 12/19/2023 8.59 (H)  1.20 - 7.70 x10*3/uL Final    Immature Granulocytes Absolute, Au* 12/19/2023 0.09  0.00 - 0.70 x10*3/uL Final    Lymphocytes Absolute 12/19/2023 3.99  1.20 - 4.80 x10*3/uL Final    Monocytes Absolute 12/19/2023 1.19 (H)  0.10 - 1.00 x10*3/uL Final    Eosinophils Absolute 12/19/2023 0.34  0.00 - 0.70 x10*3/uL Final    Basophils Absolute 12/19/2023 0.12 (H)  0.00 - 0.10 x10*3/uL Final    Glucose 12/19/2023 110 (H)  74 - 99 mg/dL Final    Sodium 12/19/2023 139  136 - 145 mmol/L Final    Potassium 12/19/2023 4.7  3.5 - 5.3 mmol/L Final    Chloride 12/19/2023 103  98 - 107 mmol/L Final    Bicarbonate 12/19/2023 26  21 - 32 mmol/L Final    Anion Gap 12/19/2023 15  10 - 20 mmol/L Final    Urea Nitrogen 12/19/2023 24 (H)  6 - 23 mg/dL Final    Creatinine 12/19/2023 1.11  0.50 - 1.30 mg/dL Final    eGFR 12/19/2023 72   >60 mL/min/1.73m*2 Final    Calcium 12/19/2023 10.2  8.6 - 10.6 mg/dL Final    Albumin 12/19/2023 4.1  3.4 - 5.0 g/dL Final    Alkaline Phosphatase 12/19/2023 73  33 - 136 U/L Final    Total Protein 12/19/2023 7.2  6.4 - 8.2 g/dL Final    AST 12/19/2023 20  9 - 39 U/L Final    Bilirubin, Total 12/19/2023 0.4  0.0 - 1.2 mg/dL Final    ALT 12/19/2023 37  10 - 52 U/L Final    Hemoglobin A1C 12/19/2023 7.3 (H)  see below % Final    Estimated Average Glucose 12/19/2023 163  Not Established mg/dL Final    Cholesterol 12/19/2023 119  0 - 199 mg/dL Final    HDL-Cholesterol 12/19/2023 34.7  mg/dL Final    Cholesterol/HDL Ratio 12/19/2023 3.4   Final    LDL Calculated 12/19/2023 39  <=99 mg/dL Final    VLDL 12/19/2023 45 (H)  0 - 40 mg/dL Final    Triglycerides 12/19/2023 227 (H)  0 - 149 mg/dL Final    Non HDL Cholesterol 12/19/2023 84  0 - 149 mg/dL Final    Prostate Specific Antigen,Screen 12/19/2023 1.06  <=4.00 ng/mL Final    Thyroid Stimulating Hormone 12/19/2023 6.98 (H)  0.44 - 3.98 mIU/L Final    Uric Acid 12/19/2023 5.7  4.0 - 7.5 mg/dL Final    WBC, Urine 12/19/2023 NONE  1-5, NONE /HPF Final    RBC, Urine 12/19/2023 1-2  NONE, 1-2, 3-5 /HPF Final    Calcium Oxalate Crystals, Urine 12/19/2023 3+ (A)  NONE, 1+ /HPF Final    Hepatitis C AB 12/19/2023 Nonreactive  Nonreactive Final    Thyroxine, Free 12/19/2023 0.95  0.78 - 1.48 ng/dL Final       Radiology: Reviewed imaging in powerchart.  No results found.      Charting was completed using voice recognition technology and may include unintended errors.

## 2024-01-27 DIAGNOSIS — E78.5 HYPERLIPIDEMIA, UNSPECIFIED HYPERLIPIDEMIA TYPE: ICD-10-CM

## 2024-01-30 RX ORDER — ROSUVASTATIN CALCIUM 40 MG/1
40 TABLET, COATED ORAL DAILY
Qty: 90 TABLET | Refills: 1 | Status: SHIPPED | OUTPATIENT
Start: 2024-01-30

## 2024-02-28 DIAGNOSIS — I10 ESSENTIAL (PRIMARY) HYPERTENSION: ICD-10-CM

## 2024-02-29 RX ORDER — BENAZEPRIL HYDROCHLORIDE 20 MG/1
20 TABLET ORAL DAILY
Qty: 90 TABLET | Refills: 3 | Status: SHIPPED | OUTPATIENT
Start: 2024-02-29 | End: 2024-04-29 | Stop reason: SDUPTHER

## 2024-03-11 DIAGNOSIS — J44.9 CHRONIC OBSTRUCTIVE PULMONARY DISEASE, UNSPECIFIED (MULTI): ICD-10-CM

## 2024-03-11 RX ORDER — UMECLIDINIUM BROMIDE AND VILANTEROL TRIFENATATE 62.5; 25 UG/1; UG/1
1 POWDER RESPIRATORY (INHALATION) DAILY
Qty: 60 EACH | Refills: 6 | Status: SHIPPED | OUTPATIENT
Start: 2024-03-11

## 2024-03-19 ENCOUNTER — OFFICE VISIT (OUTPATIENT)
Dept: PRIMARY CARE | Facility: CLINIC | Age: 69
End: 2024-03-19
Payer: COMMERCIAL

## 2024-03-19 VITALS
SYSTOLIC BLOOD PRESSURE: 124 MMHG | OXYGEN SATURATION: 98 % | DIASTOLIC BLOOD PRESSURE: 60 MMHG | WEIGHT: 163 LBS | HEART RATE: 59 BPM | HEIGHT: 69 IN | TEMPERATURE: 97.2 F | BODY MASS INDEX: 24.14 KG/M2

## 2024-03-19 DIAGNOSIS — N39.0 UTI (URINARY TRACT INFECTION), UNCOMPLICATED: ICD-10-CM

## 2024-03-19 DIAGNOSIS — I73.9 PAD (PERIPHERAL ARTERY DISEASE) (CMS-HCC): ICD-10-CM

## 2024-03-19 DIAGNOSIS — E03.9 ACQUIRED HYPOTHYROIDISM: ICD-10-CM

## 2024-03-19 DIAGNOSIS — E11.49 DIABETES MELLITUS TYPE 2 WITH NEUROLOGICAL MANIFESTATIONS (MULTI): ICD-10-CM

## 2024-03-19 DIAGNOSIS — J44.9 COPD MIXED TYPE (MULTI): ICD-10-CM

## 2024-03-19 DIAGNOSIS — J44.9 MILD CHRONIC OBSTRUCTIVE PULMONARY DISEASE (MULTI): ICD-10-CM

## 2024-03-19 DIAGNOSIS — D72.829 LEUKOCYTOSIS, UNSPECIFIED TYPE: ICD-10-CM

## 2024-03-19 DIAGNOSIS — I10 DIABETES MELLITUS WITH COINCIDENT HYPERTENSION (MULTI): ICD-10-CM

## 2024-03-19 DIAGNOSIS — Z00.00 MEDICARE ANNUAL WELLNESS VISIT, SUBSEQUENT: Primary | ICD-10-CM

## 2024-03-19 DIAGNOSIS — E78.2 DM TYPE 2 WITH DIABETIC MIXED HYPERLIPIDEMIA (MULTI): ICD-10-CM

## 2024-03-19 DIAGNOSIS — E11.9 DIABETES MELLITUS WITH COINCIDENT HYPERTENSION (MULTI): ICD-10-CM

## 2024-03-19 DIAGNOSIS — E11.69 DM TYPE 2 WITH DIABETIC MIXED HYPERLIPIDEMIA (MULTI): ICD-10-CM

## 2024-03-19 LAB
RBC #/AREA URNS AUTO: NORMAL /HPF
WBC #/AREA URNS AUTO: NORMAL /HPF

## 2024-03-19 PROCEDURE — 87086 URINE CULTURE/COLONY COUNT: CPT

## 2024-03-19 PROCEDURE — 1170F FXNL STATUS ASSESSED: CPT | Performed by: INTERNAL MEDICINE

## 2024-03-19 PROCEDURE — G0439 PPPS, SUBSEQ VISIT: HCPCS | Performed by: INTERNAL MEDICINE

## 2024-03-19 PROCEDURE — 36415 COLL VENOUS BLD VENIPUNCTURE: CPT

## 2024-03-19 PROCEDURE — 4010F ACE/ARB THERAPY RXD/TAKEN: CPT | Performed by: INTERNAL MEDICINE

## 2024-03-19 PROCEDURE — 1160F RVW MEDS BY RX/DR IN RCRD: CPT | Performed by: INTERNAL MEDICINE

## 2024-03-19 PROCEDURE — 2028F FOOT EXAM PERFORMED: CPT | Performed by: INTERNAL MEDICINE

## 2024-03-19 PROCEDURE — 1159F MED LIST DOCD IN RCRD: CPT | Performed by: INTERNAL MEDICINE

## 2024-03-19 PROCEDURE — 85025 COMPLETE CBC W/AUTO DIFF WBC: CPT

## 2024-03-19 PROCEDURE — 81001 URINALYSIS AUTO W/SCOPE: CPT

## 2024-03-19 PROCEDURE — 3074F SYST BP LT 130 MM HG: CPT | Performed by: INTERNAL MEDICINE

## 2024-03-19 PROCEDURE — 99497 ADVNCD CARE PLAN 30 MIN: CPT | Performed by: INTERNAL MEDICINE

## 2024-03-19 PROCEDURE — 96372 THER/PROPH/DIAG INJ SC/IM: CPT | Performed by: INTERNAL MEDICINE

## 2024-03-19 PROCEDURE — 99213 OFFICE O/P EST LOW 20 MIN: CPT | Performed by: INTERNAL MEDICINE

## 2024-03-19 PROCEDURE — 3078F DIAST BP <80 MM HG: CPT | Performed by: INTERNAL MEDICINE

## 2024-03-19 PROCEDURE — 1157F ADVNC CARE PLAN IN RCRD: CPT | Performed by: INTERNAL MEDICINE

## 2024-03-19 RX ORDER — LEVOFLOXACIN 500 MG/1
500 TABLET, FILM COATED ORAL DAILY
Qty: 10 TABLET | Refills: 0 | Status: SHIPPED | OUTPATIENT
Start: 2024-03-19 | End: 2024-03-29

## 2024-03-19 RX ORDER — CEFTRIAXONE 1 G/1
1 INJECTION, POWDER, FOR SOLUTION INTRAMUSCULAR; INTRAVENOUS ONCE
Status: COMPLETED | OUTPATIENT
Start: 2024-03-19 | End: 2024-03-19

## 2024-03-19 RX ORDER — IPRATROPIUM BROMIDE AND ALBUTEROL SULFATE 2.5; .5 MG/3ML; MG/3ML
3 SOLUTION RESPIRATORY (INHALATION) EVERY 8 HOURS PRN
Qty: 270 ML | Refills: 3 | Status: SHIPPED | OUTPATIENT
Start: 2024-03-19

## 2024-03-19 RX ORDER — METFORMIN HYDROCHLORIDE 500 MG/1
500 TABLET ORAL
Qty: 180 TABLET | Refills: 1 | Status: SHIPPED | OUTPATIENT
Start: 2024-03-19 | End: 2024-04-18 | Stop reason: SDUPTHER

## 2024-03-19 RX ORDER — LEVOTHYROXINE SODIUM 25 UG/1
12.5 TABLET ORAL DAILY
Qty: 45 TABLET | Refills: 1 | Status: SHIPPED | OUTPATIENT
Start: 2024-03-19 | End: 2024-04-18 | Stop reason: SDUPTHER

## 2024-03-19 RX ADMIN — CEFTRIAXONE 1 G: 1 INJECTION, POWDER, FOR SOLUTION INTRAMUSCULAR; INTRAVENOUS at 14:46

## 2024-03-19 ASSESSMENT — PATIENT HEALTH QUESTIONNAIRE - PHQ9
1. LITTLE INTEREST OR PLEASURE IN DOING THINGS: NOT AT ALL
SUM OF ALL RESPONSES TO PHQ9 QUESTIONS 1 AND 2: 0
2. FEELING DOWN, DEPRESSED OR HOPELESS: NOT AT ALL
1. LITTLE INTEREST OR PLEASURE IN DOING THINGS: NOT AT ALL
SUM OF ALL RESPONSES TO PHQ9 QUESTIONS 1 AND 2: 0
2. FEELING DOWN, DEPRESSED OR HOPELESS: NOT AT ALL

## 2024-03-19 ASSESSMENT — ACTIVITIES OF DAILY LIVING (ADL)
DOING_HOUSEWORK: INDEPENDENT
TAKING_MEDICATION: INDEPENDENT
GROCERY_SHOPPING: INDEPENDENT
DRESSING: INDEPENDENT
BATHING: INDEPENDENT
MANAGING_FINANCES: INDEPENDENT

## 2024-03-19 NOTE — PROGRESS NOTES
Assessment and Plan:  Problem List Items Addressed This Visit       Medicare annual wellness visit, subsequent - Primary    PAD (peripheral artery disease) (CMS/[facility])    COPD mixed type (CMS/[facility])    Diabetes mellitus type 2 with neurological manifestations (CMS/[facility])     Diabetes is chronic disease, it does not get cured but it can be controlled, in modern medicine there are variety of measures taken to control DM. Usually we want to preserve beta cell functions. Please understand the disease and how our life style can affect control of glycemia. Diabetes leads to macro and microvascular complications and they could be devastating. It is important to have annual eye check and frequent foot inspection and foot inspection. Kidney dysfunction including dialysis, foot amputations, neuropathy, foot ulcers and accelerated atherosclerotic vascular disease are known complications of uncontrolled DM, pt was educated and explained.           Relevant Medications    metFORMIN (Glucophage) 500 mg tablet    Diabetes mellitus with coincident hypertension (CMS/[facility])     Keep blood pressure less than 120/80 BMP twice a year         Relevant Medications    metFORMIN (Glucophage) 500 mg tablet    DM type 2 with diabetic mixed hyperlipidemia (CMS/[facility])     Keep LDL less than 80 monitor LFT lipid CPK once a year         Relevant Medications    metFORMIN (Glucophage) 500 mg tablet    levothyroxine (Synthroid) 25 mcg tablet    Acquired hypothyroidism     Monitor TSH twice a year         Relevant Medications    levothyroxine (Synthroid) 25 mcg tablet    Leukocytosis    UTI (urinary tract infection), uncomplicated    Relevant Orders    Microscopic Only, Urine    Urine Culture    CBC and Auto Differential         Chief Complaint:   Annual Medicare Wellness Office Exam/Comprehensive Problem Focused Follow Up and Physical Exam cough congestion shortness of breath    Urgency frequency    Fatigue and tired      HPI: This is a 59 [place] patient  reviewed over have no children    No siblings    Mother have a heart disease multiple bypass surgery    Father live 90+    Do smoke to drink    History of the peripheral arterial disease seen by Dr. Green underwent further revascularization left leg    Chronic urgency frequency cough congestions leukocytosis    Negative for hematuria rectal bleeding    Negative for claudication headache or chest pain    Negative for dementia depression no fall    Negative for weight loss    Cough congestion onset gradual duration few months progressed slowly aggravated by change of the weather smoking    Urgency frequency burning aggravated by dehydration underlying diabetes.      Patient Active Problem List:  Patient Active Problem List   Diagnosis    Medicare annual wellness visit, subsequent    PAD (peripheral artery disease) (CMS/[facility])    Smoker    COPD mixed type (CMS/[facility])    Diabetes mellitus type 2 with neurological manifestations (CMS/[facility])    Diabetes mellitus with coincident hypertension (CMS/[facility])    DM type 2 with diabetic mixed hyperlipidemia (CMS/[facility])    Acquired hypothyroidism    Leukocytosis    UTI (urinary tract infection), uncomplicated          Comprehensive Medical/Surgical/Social/Family History:  Family History   Problem Relation Name Age of Onset    Heart disease Mother      Other (back issues) Mother      No Known Problems Father         Past Medical History:   Diagnosis Date    Other pericardial effusion (noninflammatory) 11/18/2021    Pericardial effusion    Personal history of other diseases of male genital organs 11/18/2021    History of erectile dysfunction    Personal history of other diseases of the circulatory system 07/31/2019    History of lymphadenitis    Personal history of other diseases of the musculoskeletal system and connective tissue 07/23/2019    History of arthritis    Personal history of other diseases of the respiratory system 02/01/2021    History of allergic rhinitis    Personal history  of other diseases of the respiratory system 03/02/2020    History of sore throat    Personal history of other diseases of urinary system 10/14/2019    History of hematuria    Personal history of other endocrine, nutritional and metabolic disease 03/02/2020    History of vitamin D deficiency    Personal history of other infectious and parasitic diseases 11/07/2022    History of candidiasis of mouth       Past Surgical History:   Procedure Laterality Date    FINGER SURGERY      LEG SURGERY         Social History     Socioeconomic History    Marital status:      Spouse name: None    Number of children: None    Years of education: None    Highest education level: None   Occupational History    None   Tobacco Use    Smoking status: Some Days     Packs/day: 1.00     Years: 45.00     Additional pack years: 0.00     Total pack years: 45.00     Types: Cigars, Cigarettes    Smokeless tobacco: Never   Substance and Sexual Activity    Alcohol use: Not Currently     Alcohol/week: 0.0 - 1.0 standard drinks of alcohol    Drug use: Never    Sexual activity: None   Other Topics Concern    None   Social History Narrative    None     Social Determinants of Health     Financial Resource Strain: Not on file   Food Insecurity: Not on file   Transportation Needs: Not on file   Physical Activity: Not on file   Stress: Not on file   Social Connections: Not on file   Intimate Partner Violence: Not on file   Housing Stability: Not on file       Tobacco/Alcohol/Opioid use, as well as Illicit Drug Use was screened for/reviewed and documented in Social Documentation section of the chart and medication list as appropriate    Allergies and Medications  No Known Allergies  Current Outpatient Medications   Medication Sig Dispense Refill    Anoro Ellipta 62.5-25 mcg/actuation blister with device INHALE 1 PUFF BY MOUTH EVERY DAY 60 each 6    benazepril (Lotensin) 20 mg tablet TAKE 1 TABLET BY MOUTH EVERY DAY 90 tablet 3    cilostazol (Pletal)  100 mg tablet Take by mouth.      finasteride (Proscar) 5 mg tablet Take 1 tablet (5 mg) by mouth once daily.      rosuvastatin (Crestor) 40 mg tablet TAKE 1 TABLET BY MOUTH EVERY DAY 90 tablet 1    tamsulosin (Flomax) 0.4 mg 24 hr capsule Take 1 capsule (0.4 mg) by mouth 2 times a day.      warfarin (Coumadin) 5 mg tablet TAKE AS DIRECTED BY COUMADIN CLINIC      warfarin (Coumadin) 7.5 mg tablet Take by mouth.      levothyroxine (Synthroid) 25 mcg tablet Take 0.5 tablets (12.5 mcg) by mouth once daily. 45 tablet 1    metFORMIN (Glucophage) 500 mg tablet Take 1 tablet (500 mg) by mouth 2 times a day with meals. 180 tablet 1     No current facility-administered medications for this visit.       Medications and Supplements  prescribed by me and other practitioners or clinical pharmacist (such as prescriptions, OTC's, herbal therapies and supplements) were reviewed and documented in the medical record.     Advance directives  Advanced Care Planning (including a Living Will, Healthcare POA, as well as specific end of life choices and/or directives), was discussed for approximately 16 minutes with the patient and/or surrogate, voluntarily, and documented in the Problem List of the medical record.     Cardiac Risk Assessment  Cardiovascular risk was discussed and, if needed, lifestyle modifications recommended, including nutritional choices, exercise, and elimination of habits contributing to risk. We agreed on a plan to reduce the current cardiovascular risk based on above discussion as needed.  Aspirin use/disuse was discussed and documented in the Problem List of the medical record after reviewing the updated guidelines below:    Consider low dose Aspirin ( mg) use if the benefit for cardiovascular disease prevention outweighs risk for bleeding complications.   In general, low dose ASA should be considered:  In patients WITHOUT prior MI/stroke/PAD (primary prevention):   a. Age <60: Use if 10-year cardiovascular  "disease risk >20%, with discussion of risks and benefits with patient  b. Age 60-<70: Use if 10-year cardiovascular disease risk >20% and low bleeding (e.g., gastrointenstinal) risk, with discussion of risks and benefits with patient  c. Age >=70: Do not use    In patients WITH prior MI/stroke/PAD (secondary prevention):   Generally use unless extremely high bleeding (e.g., gastrointenstinal) risk, with discussion of risks and benefits with patient    ROS otherwise negative aside from what was mentioned above in HPI.    Visit Vitals  /60 (BP Location: Left arm, Patient Position: Sitting, BP Cuff Size: Large adult)   Pulse 59   Temp 36.2 °C (97.2 °F)   Ht 1.753 m (5' 9\")   Wt 73.9 kg (163 lb)   SpO2 98%   BMI 24.07 kg/m²   Smoking Status Some Days   BSA 1.9 m²       Physical Exam  Physical Exam:    Appearance: Alert, oriented , cooperative,  in no acute distress. Well nourished & well hydrated.    Skin: Intact,  dry skin, no lesions, rash, petechiae or purpura.     Eyes: Eyeglasses    ENT: Hearing grossly intact. External auditory canals patent, tympanic membranes intact with visible landmarks. Nares patent, mucus membranes moist. Dentition without lesions. Pharynx clear, uvula midline.     Neck: No thyromegaly carotid bruit    Pulmonary: Barrel chest decreased air entry crackles rales rhonchi moderate.    Cardiac: Systolic heart murmur    Abdomen: Suprapubic tenderness.    Genitourinary: Exam deferred.    Musculoskeletal: Arthritis of the lumbar spine   neurological: Tingling numbness L1 S1.    Psychiatric: Appropriate mood and affect.         During the course of the visit the patient was educated and counseled about age appropriate screening and preventive services. Completed preventive screenings were documented in the chart and orders were placed for outstanding screenings/procedures as documented in the Assessment and Plan.    Patient Instructions (the written plan) was given to the patient at check " out.    Charting was completed using voice recognition technology and may include unintended errors.

## 2024-03-20 LAB
BACTERIA UR CULT: NO GROWTH
BASOPHILS # BLD AUTO: 0.12 X10*3/UL (ref 0–0.1)
BASOPHILS NFR BLD AUTO: 0.9 %
EOSINOPHIL # BLD AUTO: 0.4 X10*3/UL (ref 0–0.7)
EOSINOPHIL NFR BLD AUTO: 3.1 %
ERYTHROCYTE [DISTWIDTH] IN BLOOD BY AUTOMATED COUNT: 13.7 % (ref 11.5–14.5)
HCT VFR BLD AUTO: 47.4 % (ref 41–52)
HGB BLD-MCNC: 15.5 G/DL (ref 13.5–17.5)
IMM GRANULOCYTES # BLD AUTO: 0.05 X10*3/UL (ref 0–0.7)
IMM GRANULOCYTES NFR BLD AUTO: 0.4 % (ref 0–0.9)
LYMPHOCYTES # BLD AUTO: 3.56 X10*3/UL (ref 1.2–4.8)
LYMPHOCYTES NFR BLD AUTO: 27.6 %
MCH RBC QN AUTO: 32.4 PG (ref 26–34)
MCHC RBC AUTO-ENTMCNC: 32.7 G/DL (ref 32–36)
MCV RBC AUTO: 99 FL (ref 80–100)
MONOCYTES # BLD AUTO: 0.96 X10*3/UL (ref 0.1–1)
MONOCYTES NFR BLD AUTO: 7.4 %
NEUTROPHILS # BLD AUTO: 7.83 X10*3/UL (ref 1.2–7.7)
NEUTROPHILS NFR BLD AUTO: 60.6 %
NRBC BLD-RTO: 0 /100 WBCS (ref 0–0)
PLATELET # BLD AUTO: 339 X10*3/UL (ref 150–450)
RBC # BLD AUTO: 4.78 X10*6/UL (ref 4.5–5.9)
WBC # BLD AUTO: 12.9 X10*3/UL (ref 4.4–11.3)

## 2024-03-21 ENCOUNTER — TELEPHONE (OUTPATIENT)
Dept: PRIMARY CARE | Facility: CLINIC | Age: 69
End: 2024-03-21
Payer: COMMERCIAL

## 2024-03-21 NOTE — TELEPHONE ENCOUNTER
----- Message from hPam Damian MD sent at 3/21/2024 11:09 AM EDT -----  Chronic leukocytosis advised to see hematology Dr. Garza

## 2024-03-21 NOTE — TELEPHONE ENCOUNTER
----- Message from Pham Damian MD sent at 3/21/2024 11:09 AM EDT -----  Chronic leukocytosis advised to see hematology Dr. Garza

## 2024-03-22 ENCOUNTER — TELEPHONE (OUTPATIENT)
Dept: PRIMARY CARE | Facility: CLINIC | Age: 69
End: 2024-03-22
Payer: COMMERCIAL

## 2024-03-22 DIAGNOSIS — K21.9 GASTROESOPHAGEAL REFLUX DISEASE WITHOUT ESOPHAGITIS: ICD-10-CM

## 2024-03-22 RX ORDER — OMEPRAZOLE 20 MG/1
20 TABLET, DELAYED RELEASE ORAL DAILY
Qty: 30 TABLET | Refills: 0 | COMMUNITY
Start: 2024-03-22

## 2024-03-22 NOTE — TELEPHONE ENCOUNTER
----- Message from Pham Damian MD sent at 3/22/2024  1:30 PM EDT -----  Impression:   No definite acute pulmonary findings within the visualized lung fields.     Mild anterior and posterior pericardial thickening.     Atherosclerotic and heavily calcified thoracic aorta.     Coronary artery calcifications.     Small hiatal hernia.   Advised to take aspirin 81 mg a day plus Prilosec 20 mg a day follow-up 4 weeks

## 2024-04-16 ENCOUNTER — APPOINTMENT (OUTPATIENT)
Dept: PRIMARY CARE | Facility: CLINIC | Age: 69
End: 2024-04-16
Payer: COMMERCIAL

## 2024-04-16 ENCOUNTER — TELEPHONE (OUTPATIENT)
Dept: PRIMARY CARE | Facility: CLINIC | Age: 69
End: 2024-04-16

## 2024-04-18 DIAGNOSIS — I10 DIABETES MELLITUS WITH COINCIDENT HYPERTENSION (MULTI): ICD-10-CM

## 2024-04-18 DIAGNOSIS — E11.9 DIABETES MELLITUS WITH COINCIDENT HYPERTENSION (MULTI): ICD-10-CM

## 2024-04-18 DIAGNOSIS — E11.69 DM TYPE 2 WITH DIABETIC MIXED HYPERLIPIDEMIA (MULTI): ICD-10-CM

## 2024-04-18 DIAGNOSIS — E11.49 DIABETES MELLITUS TYPE 2 WITH NEUROLOGICAL MANIFESTATIONS (MULTI): ICD-10-CM

## 2024-04-18 DIAGNOSIS — E78.2 DM TYPE 2 WITH DIABETIC MIXED HYPERLIPIDEMIA (MULTI): ICD-10-CM

## 2024-04-18 DIAGNOSIS — E03.9 ACQUIRED HYPOTHYROIDISM: ICD-10-CM

## 2024-04-18 RX ORDER — METFORMIN HYDROCHLORIDE 500 MG/1
500 TABLET ORAL
Qty: 180 TABLET | Refills: 3 | Status: SHIPPED | OUTPATIENT
Start: 2024-04-18 | End: 2024-04-29 | Stop reason: SDUPTHER

## 2024-04-18 RX ORDER — LEVOTHYROXINE SODIUM 25 UG/1
12.5 TABLET ORAL DAILY
Qty: 45 TABLET | Refills: 3 | Status: SHIPPED | OUTPATIENT
Start: 2024-04-18

## 2024-04-29 DIAGNOSIS — I10 DIABETES MELLITUS WITH COINCIDENT HYPERTENSION (MULTI): ICD-10-CM

## 2024-04-29 DIAGNOSIS — E11.49 DIABETES MELLITUS TYPE 2 WITH NEUROLOGICAL MANIFESTATIONS (MULTI): ICD-10-CM

## 2024-04-29 DIAGNOSIS — E11.69 DM TYPE 2 WITH DIABETIC MIXED HYPERLIPIDEMIA (MULTI): ICD-10-CM

## 2024-04-29 DIAGNOSIS — E11.9 DIABETES MELLITUS WITH COINCIDENT HYPERTENSION (MULTI): ICD-10-CM

## 2024-04-29 DIAGNOSIS — I10 ESSENTIAL (PRIMARY) HYPERTENSION: ICD-10-CM

## 2024-04-29 DIAGNOSIS — E78.2 DM TYPE 2 WITH DIABETIC MIXED HYPERLIPIDEMIA (MULTI): ICD-10-CM

## 2024-04-29 RX ORDER — BENAZEPRIL HYDROCHLORIDE 20 MG/1
20 TABLET ORAL DAILY
Qty: 90 TABLET | Refills: 3 | Status: SHIPPED | OUTPATIENT
Start: 2024-04-29

## 2024-04-29 RX ORDER — METFORMIN HYDROCHLORIDE 500 MG/1
500 TABLET ORAL
Qty: 180 TABLET | Refills: 3 | Status: SHIPPED | OUTPATIENT
Start: 2024-04-29

## 2024-06-20 ENCOUNTER — APPOINTMENT (OUTPATIENT)
Dept: PRIMARY CARE | Facility: CLINIC | Age: 69
End: 2024-06-20
Payer: COMMERCIAL

## 2024-06-20 VITALS
TEMPERATURE: 97.4 F | HEIGHT: 69 IN | BODY MASS INDEX: 23.64 KG/M2 | WEIGHT: 159.6 LBS | SYSTOLIC BLOOD PRESSURE: 125 MMHG | OXYGEN SATURATION: 97 % | DIASTOLIC BLOOD PRESSURE: 69 MMHG

## 2024-06-20 DIAGNOSIS — D72.829 LEUKOCYTOSIS, UNSPECIFIED TYPE: Primary | ICD-10-CM

## 2024-06-20 DIAGNOSIS — E11.49 DIABETES MELLITUS TYPE 2 WITH NEUROLOGICAL MANIFESTATIONS (MULTI): ICD-10-CM

## 2024-06-20 DIAGNOSIS — I10 DIABETES MELLITUS WITH COINCIDENT HYPERTENSION (MULTI): ICD-10-CM

## 2024-06-20 DIAGNOSIS — F17.200 SMOKER: ICD-10-CM

## 2024-06-20 DIAGNOSIS — J44.9 COPD MIXED TYPE (MULTI): ICD-10-CM

## 2024-06-20 DIAGNOSIS — E11.9 DIABETES MELLITUS WITH COINCIDENT HYPERTENSION (MULTI): ICD-10-CM

## 2024-06-20 DIAGNOSIS — I73.9 PAD (PERIPHERAL ARTERY DISEASE) (CMS-HCC): ICD-10-CM

## 2024-06-20 PROBLEM — Z00.00 MEDICARE ANNUAL WELLNESS VISIT, SUBSEQUENT: Status: RESOLVED | Noted: 2023-12-19 | Resolved: 2024-06-20

## 2024-06-20 PROBLEM — N39.0 UTI (URINARY TRACT INFECTION), UNCOMPLICATED: Status: RESOLVED | Noted: 2024-03-19 | Resolved: 2024-06-20

## 2024-06-20 PROCEDURE — 1159F MED LIST DOCD IN RCRD: CPT | Performed by: INTERNAL MEDICINE

## 2024-06-20 PROCEDURE — 3074F SYST BP LT 130 MM HG: CPT | Performed by: INTERNAL MEDICINE

## 2024-06-20 PROCEDURE — 99406 BEHAV CHNG SMOKING 3-10 MIN: CPT | Performed by: INTERNAL MEDICINE

## 2024-06-20 PROCEDURE — 1160F RVW MEDS BY RX/DR IN RCRD: CPT | Performed by: INTERNAL MEDICINE

## 2024-06-20 PROCEDURE — 3078F DIAST BP <80 MM HG: CPT | Performed by: INTERNAL MEDICINE

## 2024-06-20 PROCEDURE — 2028F FOOT EXAM PERFORMED: CPT | Performed by: INTERNAL MEDICINE

## 2024-06-20 PROCEDURE — 1157F ADVNC CARE PLAN IN RCRD: CPT | Performed by: INTERNAL MEDICINE

## 2024-06-20 PROCEDURE — 4010F ACE/ARB THERAPY RXD/TAKEN: CPT | Performed by: INTERNAL MEDICINE

## 2024-06-20 PROCEDURE — 99214 OFFICE O/P EST MOD 30 MIN: CPT | Performed by: INTERNAL MEDICINE

## 2024-06-20 RX ORDER — ASPIRIN 81 MG/1
81 TABLET ORAL DAILY
COMMUNITY

## 2024-06-20 NOTE — ASSESSMENT & PLAN NOTE
Diabetes is chronic disease, it does not get cured but it can be controlled, in modern medicine there are variety of measures taken to control DM. Usually we want to preserve beta cell functions. Please understand the disease and how our life style can affect control of glycemia. Diabetes leads to macro and microvascular complications and they could be devastating. It is important to have annual eye check and frequent foot inspection and foot inspection. Kidney dysfunction including dialysis, foot amputations, neuropathy, foot ulcers and accelerated atherosclerotic vascular disease are known complications of uncontrolled DM, pt was educated and explained.     Physical Therapy Daily Progress Note  Visit: 6    Jay Helms reports: some soreness from and activity - minor improvement in knee pain since beginning PT.     Subjective       Objective   See Exercise, Manual, and Modality Logs for complete treatment.       Assessment/Plan     Improving LE strength, decreased cueing needed for standing strength exercise, subjective pain levels small change at this time.       Plan:  Continue current             Timed:              Therapeutic Exercise:    15     mins  [id number];     Neuromuscular Delfin:    10    mins  [id number];          Timed Treatment:   25   mins   Total Treatment:     25   mins  Everardo Manrique PT,DPT  Physical Therapist

## 2024-06-20 NOTE — ASSESSMENT & PLAN NOTE
I spent 10 minutes counseling patient about need for smoking/tobacco cessation and support discuss the nicotine  replacement therapy ,varenicline,bupropion,  hypnosis, support group, acupuncture as a potential options  .

## 2024-06-20 NOTE — ASSESSMENT & PLAN NOTE
Seen by him at oncology will monitor LDH and reticulocyte count WBC alkaline phosphatase once a year

## 2024-06-20 NOTE — PROGRESS NOTES
Subjective   Patient ID: Vijay Juarez is a 69 y.o. male who presents for Follow-up (3 month ).    Assessment/Plan     Problem List Items Addressed This Visit       PAD (peripheral artery disease) (CMS-[facility])     Refer to Dr. Thomas PVR ultrasound of the aorta 2D echo of the heart carotid duplex once a year         Smoker     I spent 10 minutes counseling patient about need for smoking/tobacco cessation and support discuss the nicotine  replacement therapy ,varenicline,bupropion,  hypnosis, support group, acupuncture as a potential options  .         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 Usually we want to preserve beta cell functions. Please understand the disease and how our life style can affect control of glycemia. Diabetes leads to macro and microvascular complications and they could be devastating. It is important to have annual eye check and frequent foot inspection and foot inspection. Kidney dysfunction including dialysis, foot amputations, neuropathy, foot ulcers and accelerated atherosclerotic vascular disease are known complications of uncontrolled DM, pt was educated and explained.           Diabetes mellitus with coincident hypertension (Multi)     Patients BP readings reviewed and addressed, as we age our arteries turn stiffer and less elastic. Restricting salt consumption and staying physically fit with regular exercise regimen is the only way to keep our vasculature less tonic. Studies have shown that keeping ideal body wt, exercise routine about 140 to 150 minutes a week, eating variety of plant based diet and drinking plentiful water are quite helpful. Monitor BP twice or once a week at home and bring log to  be reviewed by me. Uncontrolled BP has long term consequences including heart failure, myocardial infarction, accelerated atherosclerosis and kidney dysfunction. Therapy reviewed and explained.           Leukocytosis - Primary     Seen by him at oncology will monitor LDH and reticulocyte count WBC alkaline phosphatase once a year          Patient was evaluated today, problem list was reviewed, problems and concerns addressed, Rx list reviewed and updated, lab and tests were noted and reviewed. Life style changes were discussed, always it works better if we eat plant based diet and plenty of fibres and roughage. Consume adequate amount of water and avoid alcohol, light to moderate physical activities and stress reduction are always beneficial for ongoing physical well being. Do not forget to have 6 to 7 hours of sleep regularly and avoid late night jessica screen exposure.    HPI 69-year-old patient COPD hypertension hyperlipidemia PAD CAD BPH chronic atrial fibrillation continue to smoke and drink complaining arthralgia myalgia fatigue tired weakness chronic bronchitis chronic WBC elevation seen by pulmonary and hematology service nothing serious    Negative for hematuria hemoptysis rectal bleeding    Negative for fall    Negative for febrile illness    Negative for anxiety depressive dementia  Past Medical History:   Diagnosis Date    Other pericardial effusion (noninflammatory) ([facility]) 11/18/2021    Pericardial effusion    Personal history of other diseases of male genital organs 11/18/2021    History of erectile dysfunction    Personal history of other diseases of the circulatory system 07/31/2019    History of lymphadenitis    Personal history of other diseases of the musculoskeletal system and connective tissue 07/23/2019    History of arthritis    Personal history of other diseases of the respiratory system 02/01/2021    History of allergic rhinitis    Personal history of other diseases of the respiratory system  03/02/2020    History of sore throat    Personal history of other diseases of urinary system 10/14/2019    History of hematuria    Personal history of other endocrine, nutritional and metabolic disease 03/02/2020    History of vitamin D deficiency    Personal history of other infectious and parasitic diseases 11/07/2022    History of candidiasis of mouth     Past Surgical History:   Procedure Laterality Date    FINGER SURGERY      LEG SURGERY       No Known Allergies  Current Outpatient Medications   Medication Sig Dispense Refill    Anoro Ellipta 62.5-25 mcg/actuation blister with device INHALE 1 PUFF BY MOUTH EVERY DAY 60 each 6    aspirin 81 mg EC tablet Take 1 tablet (81 mg) by mouth once daily.      benazepril (Lotensin) 20 mg tablet Take 1 tablet (20 mg) by mouth once daily. 90 tablet 3    cilostazol (Pletal) 100 mg tablet Take by mouth.      finasteride (Proscar) 5 mg tablet Take 1 tablet (5 mg) by mouth once daily.      ipratropium-albuteroL (Duo-Neb) 0.5-2.5 mg/3 mL nebulizer solution Take 3 mL by nebulization every 8 hours if needed for wheezing or shortness of breath. 270 mL 3    levothyroxine (Synthroid) 25 mcg tablet Take 0.5 tablets (12.5 mcg) by mouth once daily. 45 tablet 3    metFORMIN (Glucophage) 500 mg tablet Take 1 tablet (500 mg) by mouth 2 times a day with meals. 180 tablet 3    rosuvastatin (Crestor) 40 mg tablet TAKE 1 TABLET BY MOUTH EVERY DAY 90 tablet 1    tamsulosin (Flomax) 0.4 mg 24 hr capsule Take 1 capsule (0.4 mg) by mouth 2 times a day.      warfarin (Coumadin) 5 mg tablet TAKE AS DIRECTED BY COUMADIN CLINIC      warfarin (Coumadin) 7.5 mg tablet Take by mouth.       No current facility-administered medications for this visit.     Family History   Problem Relation Name Age of Onset    Heart disease Mother      Other (back issues) Mother      No Known Problems Father       Social History     Socioeconomic History    Marital status:      Spouse name: None    Number of  "children: None    Years of education: None    Highest education level: None   Occupational History    None   Tobacco Use    Smoking status: Some Days     Current packs/day: 1.00     Average packs/day: 1 pack/day for 45.0 years (45.0 ttl pk-yrs)     Types: Cigars, Cigarettes    Smokeless tobacco: Never   Substance and Sexual Activity    Alcohol use: Not Currently     Alcohol/week: 0.0 - 2.0 standard drinks of alcohol    Drug use: Never    Sexual activity: None   Other Topics Concern    None   Social History Narrative    None     Social Determinants of Health     Financial Resource Strain: Not on file   Food Insecurity: Not on file   Transportation Needs: Not on file   Physical Activity: Not on file   Stress: Not on file   Social Connections: Not on file   Intimate Partner Violence: Not on file   Housing Stability: Not on file     Immunization History   Administered Date(s) Administered    Flu vaccine (IIV4), preservative free *Check age/dose* 11/06/2020, 12/01/2020    Flu vaccine, quadrivalent, high-dose, preservative free, age 65y+ (FLUZONE) 11/06/2020    Influenza, Unspecified 11/06/2020    Pfizer Purple Cap SARS-CoV-2 03/25/2021, 04/15/2021, 11/26/2021    Td vaccine, age 7 years and older (TDVAX) 12/13/2023    Zoster vaccine, recombinant, adult (SHINGRIX) 02/01/2021, 05/06/2021    Zoster, Unspecified 02/01/2021, 05/06/2021       Review of Systems  Review of systems is otherwise negative unless stated above or in history of present illness.    Objective   Visit Vitals  /69   Temp 36.3 °C (97.4 °F)   Ht 1.753 m (5' 9\")   Wt 72.4 kg (159 lb 9.6 oz)   SpO2 97%   BMI 23.57 kg/m²   Smoking Status Some Days   BSA 1.88 m²     Physical Exam  Constitutional: BMI 23     General: not in acute distress.   HENT:      Head: Normocephalic and atraumatic.      Nose: Nose normal.   Eyes:      Extraocular Movements: Extraocular movements intact.      Conjunctiva/sclera: Conjunctivae normal.   Cardiovascular: Heart murmur " irregularly irregular      Pulmonary: Decreased air entry crackles rales rhonchi     Effort: Pulmonary effort is normal.      Breath sounds: Normal, Bilat Equal AE  Skin: Ischemic skin lower extremity bilaterally     General: Skin is warm.   Neurological:      Mental Status: He is alert and oriented to person, place, and time.   Psychiatric:         Mood and Affect: Mood normal.         Behavior: Behavior normal.   Musculoskeletal osteopenia osteoarthritis  FROM in all extremitirs,  Joint-no swelling or tenderness    Office Visit on 03/19/2024   Component Date Value Ref Range Status    WBC, Urine 03/19/2024 NONE  1-5, NONE /HPF Final    RBC, Urine 03/19/2024 NONE  NONE, 1-2, 3-5 /HPF Final    Urine Culture 03/19/2024 No growth   Final    WBC 03/19/2024 12.9 (H)  4.4 - 11.3 x10*3/uL Final    nRBC 03/19/2024 0.0  0.0 - 0.0 /100 WBCs Final    RBC 03/19/2024 4.78  4.50 - 5.90 x10*6/uL Final    Hemoglobin 03/19/2024 15.5  13.5 - 17.5 g/dL Final    Hematocrit 03/19/2024 47.4  41.0 - 52.0 % Final    MCV 03/19/2024 99  80 - 100 fL Final    MCH 03/19/2024 32.4  26.0 - 34.0 pg Final    MCHC 03/19/2024 32.7  32.0 - 36.0 g/dL Final    RDW 03/19/2024 13.7  11.5 - 14.5 % Final    Platelets 03/19/2024 339  150 - 450 x10*3/uL Final    Neutrophils % 03/19/2024 60.6  40.0 - 80.0 % Final    Immature Granulocytes %, Automated 03/19/2024 0.4  0.0 - 0.9 % Final    Lymphocytes % 03/19/2024 27.6  13.0 - 44.0 % Final    Monocytes % 03/19/2024 7.4  2.0 - 10.0 % Final    Eosinophils % 03/19/2024 3.1  0.0 - 6.0 % Final    Basophils % 03/19/2024 0.9  0.0 - 2.0 % Final    Neutrophils Absolute 03/19/2024 7.83 (H)  1.20 - 7.70 x10*3/uL Final    Immature Granulocytes Absolute, Au* 03/19/2024 0.05  0.00 - 0.70 x10*3/uL Final    Lymphocytes Absolute 03/19/2024 3.56  1.20 - 4.80 x10*3/uL Final    Monocytes Absolute 03/19/2024 0.96  0.10 - 1.00 x10*3/uL Final    Eosinophils Absolute 03/19/2024 0.40  0.00 - 0.70 x10*3/uL Final    Basophils Absolute  03/19/2024 0.12 (H)  0.00 - 0.10 x10*3/uL Final       Radiology: Reviewed imaging in powerchart.  No results found.      Charting was completed using voice recognition technology and may include unintended errors.

## 2024-10-13 DIAGNOSIS — E78.5 HYPERLIPIDEMIA, UNSPECIFIED HYPERLIPIDEMIA TYPE: ICD-10-CM

## 2024-10-14 RX ORDER — ROSUVASTATIN CALCIUM 40 MG/1
40 TABLET, COATED ORAL DAILY
Qty: 90 TABLET | Refills: 3 | Status: SHIPPED | OUTPATIENT
Start: 2024-10-14

## 2024-11-04 ENCOUNTER — TELEMEDICINE (OUTPATIENT)
Dept: PRIMARY CARE | Facility: CLINIC | Age: 69
End: 2024-11-04
Payer: COMMERCIAL

## 2024-11-04 ENCOUNTER — TELEPHONE (OUTPATIENT)
Dept: PRIMARY CARE | Facility: CLINIC | Age: 69
End: 2024-11-04

## 2024-11-04 VITALS
DIASTOLIC BLOOD PRESSURE: 70 MMHG | BODY MASS INDEX: 23.11 KG/M2 | WEIGHT: 156 LBS | SYSTOLIC BLOOD PRESSURE: 125 MMHG | HEIGHT: 69 IN

## 2024-11-04 DIAGNOSIS — F17.200 SMOKER: ICD-10-CM

## 2024-11-04 DIAGNOSIS — E11.9 DIABETES MELLITUS WITH COINCIDENT HYPERTENSION (MULTI): ICD-10-CM

## 2024-11-04 DIAGNOSIS — Z12.2 ENCOUNTER FOR SCREENING FOR LUNG CANCER: ICD-10-CM

## 2024-11-04 DIAGNOSIS — I10 DIABETES MELLITUS WITH COINCIDENT HYPERTENSION (MULTI): ICD-10-CM

## 2024-11-04 DIAGNOSIS — E78.2 DM TYPE 2 WITH DIABETIC MIXED HYPERLIPIDEMIA (MULTI): ICD-10-CM

## 2024-11-04 DIAGNOSIS — N40.0 BENIGN PROSTATIC HYPERPLASIA WITHOUT LOWER URINARY TRACT SYMPTOMS: Primary | ICD-10-CM

## 2024-11-04 DIAGNOSIS — E11.49 DIABETES MELLITUS TYPE 2 WITH NEUROLOGICAL MANIFESTATIONS (MULTI): ICD-10-CM

## 2024-11-04 DIAGNOSIS — E03.9 ACQUIRED HYPOTHYROIDISM: ICD-10-CM

## 2024-11-04 DIAGNOSIS — I73.9 PAD (PERIPHERAL ARTERY DISEASE) (CMS-HCC): ICD-10-CM

## 2024-11-04 DIAGNOSIS — E11.69 DM TYPE 2 WITH DIABETIC MIXED HYPERLIPIDEMIA (MULTI): ICD-10-CM

## 2024-11-04 DIAGNOSIS — J44.9 COPD MIXED TYPE (MULTI): ICD-10-CM

## 2024-11-04 DIAGNOSIS — Z13.6 SCREENING FOR ABDOMINAL AORTIC ANEURYSM: ICD-10-CM

## 2024-11-04 DIAGNOSIS — Z87.891 AGGRESSIVE FORMER SMOKER: ICD-10-CM

## 2024-11-04 PROBLEM — D72.829 LEUKOCYTOSIS: Status: RESOLVED | Noted: 2024-03-19 | Resolved: 2024-11-04

## 2024-11-04 PROCEDURE — 1160F RVW MEDS BY RX/DR IN RCRD: CPT | Performed by: INTERNAL MEDICINE

## 2024-11-04 PROCEDURE — 99213 OFFICE O/P EST LOW 20 MIN: CPT | Performed by: INTERNAL MEDICINE

## 2024-11-04 PROCEDURE — 4010F ACE/ARB THERAPY RXD/TAKEN: CPT | Performed by: INTERNAL MEDICINE

## 2024-11-04 PROCEDURE — 2028F FOOT EXAM PERFORMED: CPT | Performed by: INTERNAL MEDICINE

## 2024-11-04 PROCEDURE — 1157F ADVNC CARE PLAN IN RCRD: CPT | Performed by: INTERNAL MEDICINE

## 2024-11-04 PROCEDURE — G2211 COMPLEX E/M VISIT ADD ON: HCPCS | Performed by: INTERNAL MEDICINE

## 2024-11-04 PROCEDURE — 3008F BODY MASS INDEX DOCD: CPT | Performed by: INTERNAL MEDICINE

## 2024-11-04 PROCEDURE — 3078F DIAST BP <80 MM HG: CPT | Performed by: INTERNAL MEDICINE

## 2024-11-04 PROCEDURE — 1123F ACP DISCUSS/DSCN MKR DOCD: CPT | Performed by: INTERNAL MEDICINE

## 2024-11-04 PROCEDURE — 3074F SYST BP LT 130 MM HG: CPT | Performed by: INTERNAL MEDICINE

## 2024-11-04 PROCEDURE — 1159F MED LIST DOCD IN RCRD: CPT | Performed by: INTERNAL MEDICINE

## 2024-11-04 ASSESSMENT — PATIENT HEALTH QUESTIONNAIRE - PHQ9
2. FEELING DOWN, DEPRESSED OR HOPELESS: NOT AT ALL
1. LITTLE INTEREST OR PLEASURE IN DOING THINGS: NOT AT ALL
SUM OF ALL RESPONSES TO PHQ9 QUESTIONS 1 AND 2: 0

## 2024-11-04 NOTE — PROGRESS NOTES
Subjective   Patient ID: Vijay Juarez is a 69 y.o. male who presents for Virtual Visit (Needs order for PSA and urine ).    Assessment/Plan     Problem List Items Addressed This Visit       Encounter for screening for lung cancer    Relevant Orders    Albumin-Creatinine Ratio, Urine Random    Comprehensive Metabolic Panel    Magnesium    Hemoglobin A1C    Uric Acid    TSH with reflex to Free T4 if abnormal    Protime-INR    Prostate Specific Antigen, Screen    Urine Culture    CT lung screening low dose    Colonoscopy Screening; Average Risk Patient    PAD (peripheral artery disease) (CMS-HCC)     Get a PVR ultrasound of aorta continue Coumadin vascular Dr. Cameron to follow         Relevant Orders    Albumin-Creatinine Ratio, Urine Random    Comprehensive Metabolic Panel    Magnesium    Hemoglobin A1C    Uric Acid    TSH with reflex to Free T4 if abnormal    Protime-INR    Prostate Specific Antigen, Screen    Urine Culture    Colonoscopy Screening; Average Risk Patient    Smoker    Relevant Orders    Albumin-Creatinine Ratio, Urine Random    Comprehensive Metabolic Panel    Magnesium    Hemoglobin A1C    Uric Acid    TSH with reflex to Free T4 if abnormal    Protime-INR    Prostate Specific Antigen, Screen    Urine Culture    Colonoscopy Screening; Average Risk Patient    COPD mixed type (Multi)     I spent 10 minutes counseling patient about need for smoking/tobacco cessation and support discuss the nicotine  replacement therapy ,varenicline,bupropion,  hypnosis, support group, acupuncture as a potential options  .  I discussed smoking history/status that determined patient with criteria for lung cancer screening with a low-dose CT scan. By using shared decision  making we  determinded the patient will benefit from the screening, including discussion of benefit and harms of screening, follow-up diagnostic testing, overt diagnosis, false positive rate, and total radiation exposure. I counseled the patient on the  importance of adhering to a annul lung cancer low-dose CT scan screening, the impact off comorbidities, and inability or unwillingness to undergo's diagnosis and treatment if abnormality discovered. I provided patient an order for low-dose CT lung cancer screening    I spent 8 minutes counseling the patient on the importance of abstaining from the tobacco/cigarette smoking and  provided information about tobacco cessation intervention I provided patient an order for tobacco suggestion therapy             Relevant Orders    Albumin-Creatinine Ratio, Urine Random    Comprehensive Metabolic Panel    Magnesium    Hemoglobin A1C    Uric Acid    TSH with reflex to Free T4 if abnormal    Protime-INR    Prostate Specific Antigen, Screen    Urine Culture    Colonoscopy Screening; Average Risk Patient    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 Restricting salt consumption and staying physically fit with regular exercise regimen is the only way to keep our vasculature less tonic. Studies have shown that keeping ideal body wt, exercise routine about 140 to 150 minutes a week, eating variety of plant based diet and drinking plentiful water are quite helpful. Monitor BP twice or once a week at home and bring log to be reviewed by me. Uncontrolled BP has long term consequences including heart failure, myocardial infarction, accelerated atherosclerosis and kidney dysfunction. Therapy reviewed and explained.           DM type 2  with diabetic mixed hyperlipidemia (Multi)     Diabetes is chronic disease, it does not get cured but it can be controlled, in modern medicine there are variety of measures taken to control DM. Usually we want to preserve beta cell functions. Please understand the disease and how our life style can affect control of glycemia. Diabetes leads to macro and microvascular complications and they could be devastating. It is important to have annual eye check and frequent foot inspection and foot inspection. Kidney dysfunction including dialysis, foot amputations, neuropathy, foot ulcers and accelerated atherosclerotic vascular disease are known complications of uncontrolled DM, pt was educated and explained.           Acquired hypothyroidism     Check TSH twice a year         Relevant Orders    Albumin-Creatinine Ratio, Urine Random    Comprehensive Metabolic Panel    Magnesium    Hemoglobin A1C    Uric Acid    TSH with reflex to Free T4 if abnormal    Protime-INR    Prostate Specific Antigen, Screen    Urine Culture    Colonoscopy Screening; Average Risk Patient    Benign prostatic hyperplasia without lower urinary tract symptoms - Primary    Relevant Orders    Albumin-Creatinine Ratio, Urine Random    Comprehensive Metabolic Panel    Magnesium    Hemoglobin A1C    Uric Acid    TSH with reflex to Free T4 if abnormal    Protime-INR    Prostate Specific Antigen, Screen    Urine Culture    Colonoscopy Screening; Average Risk Patient     Other Visit Diagnoses       Aggressive former smoker        Relevant Orders    CT lung screening low dose          Chronic leukocytosis WBC 12.9 TSH 0.95 TSH 6.98 PSA 0.16 LDL 39 hemoglobin A1c 7.3 potassium 4.7 CT of the lung vascular ultrasound of the aorta and colonoscopy order in the chart  HPI this is a 69-year-old patient have metabolic syndrome diabetes hypertension hyperlipidemia PAD had a left leg from pop bypass seen by vascular DVT on chronic Coumadin follow-up with the vascular  and Coumadin clinic    Complaining urgency frequency associated with erectile dysfunction going to see urologist taking the Proscar and Flomax advised to check UA CNS and PSA    Negative for hematuria rectal bleeding    Ex-smoker continues smoke cigar advised to the history of the peripheral artery disease seen by vascular get ultrasound of the aorta and calcium score for the heart and his CAT scan of the lung and colonoscopy of the colon refer patient to vascular and vascular and urologist and GI service    Negative for PND orthopnea    Negative for claudication    Negative for hemoptysis hematuria  Past Medical History:   Diagnosis Date    Other pericardial effusion (noninflammatory) ([facility] 11/18/2021    Pericardial effusion    Personal history of other diseases of male genital organs 11/18/2021    History of erectile dysfunction    Personal history of other diseases of the circulatory system 07/31/2019    History of lymphadenitis    Personal history of other diseases of the musculoskeletal system and connective tissue 07/23/2019    History of arthritis    Personal history of other diseases of the respiratory system 02/01/2021    History of allergic rhinitis    Personal history of other diseases of the respiratory system 03/02/2020    History of sore throat    Personal history of other diseases of urinary system 10/14/2019    History of hematuria    Personal history of other endocrine, nutritional and metabolic disease 03/02/2020    History of vitamin D deficiency    Personal history of other infectious and parasitic diseases 11/07/2022    History of candidiasis of mouth     Past Surgical History:   Procedure Laterality Date    FINGER SURGERY      LEG SURGERY       No Known Allergies  Current Outpatient Medications   Medication Sig Dispense Refill    Anoro Ellipta 62.5-25 mcg/actuation blister with device INHALE 1 PUFF BY MOUTH EVERY DAY 60 each 6    aspirin 81 mg EC tablet Take 1 tablet (81 mg) by mouth once  daily.      benazepril (Lotensin) 20 mg tablet Take 1 tablet (20 mg) by mouth once daily. 90 tablet 3    cilostazol (Pletal) 100 mg tablet Take by mouth.      finasteride (Proscar) 5 mg tablet Take 1 tablet (5 mg) by mouth once daily.      ipratropium-albuteroL (Duo-Neb) 0.5-2.5 mg/3 mL nebulizer solution Take 3 mL by nebulization every 8 hours if needed for wheezing or shortness of breath. 270 mL 3    levothyroxine (Synthroid) 25 mcg tablet Take 0.5 tablets (12.5 mcg) by mouth once daily. 45 tablet 3    metFORMIN (Glucophage) 500 mg tablet Take 1 tablet (500 mg) by mouth 2 times a day with meals. 180 tablet 3    rosuvastatin (Crestor) 40 mg tablet TAKE 1 TABLET BY MOUTH EVERY DAY 90 tablet 3    tamsulosin (Flomax) 0.4 mg 24 hr capsule Take 1 capsule (0.4 mg) by mouth 2 times a day.      warfarin (Coumadin) 5 mg tablet TAKE AS DIRECTED BY COUMADIN CLINIC      warfarin (Coumadin) 7.5 mg tablet Take by mouth.       No current facility-administered medications for this visit.     Family History   Problem Relation Name Age of Onset    Heart disease Mother      Other (back issues) Mother      No Known Problems Father       Social History     Socioeconomic History    Marital status:    Tobacco Use    Smoking status: Some Days     Types: Cigars    Smokeless tobacco: Never   Substance and Sexual Activity    Alcohol use: Yes     Alcohol/week: 0.0 - 2.0 standard drinks of alcohol    Drug use: Never     Immunization History   Administered Date(s) Administered    Flu vaccine (IIV4), preservative free *Check age/dose* 11/06/2020, 12/01/2020    Flu vaccine, quadrivalent, high-dose, preservative free, age 65y+ (FLUZONE) 11/06/2020    Influenza, Unspecified 11/06/2020    Pfizer Purple Cap SARS-CoV-2 03/25/2021, 04/15/2021, 11/26/2021    Td vaccine, age 7 years and older (TDVAX) 12/13/2023    Zoster vaccine, recombinant, adult (SHINGRIX) 02/01/2021, 05/06/2021    Zoster, Unspecified 02/01/2021, 05/06/2021       Review of  "Systems  Review of systems is otherwise negative unless stated above or in history of present illness.    Objective   Visit Vitals  /70   Ht 1.753 m (5' 9\")   Wt 70.8 kg (156 lb)   BMI 23.04 kg/m²   Smoking Status Some Days   BSA 1.86 m²     Physical Exam  .Doxy  This visit was completed via video audio relation to  covid 19 pandemic all issues as below that discuss and address but no physical exam was performed if it was felt that patient should be evaluated in clinic and they have been advised to follow . Patient verbally consented to visit and spent  more than 50% discuss about patient's complaint of problem and plan        No visits with results within 4 Month(s) from this visit.   Latest known visit with results is:   Office Visit on 03/19/2024   Component Date Value Ref Range Status    WBC, Urine 03/19/2024 NONE  1-5, NONE /HPF Final    RBC, Urine 03/19/2024 NONE  NONE, 1-2, 3-5 /HPF Final    Urine Culture 03/19/2024 No growth   Final    WBC 03/19/2024 12.9 (H)  4.4 - 11.3 x10*3/uL Final    nRBC 03/19/2024 0.0  0.0 - 0.0 /100 WBCs Final    RBC 03/19/2024 4.78  4.50 - 5.90 x10*6/uL Final    Hemoglobin 03/19/2024 15.5  13.5 - 17.5 g/dL Final    Hematocrit 03/19/2024 47.4  41.0 - 52.0 % Final    MCV 03/19/2024 99  80 - 100 fL Final    MCH 03/19/2024 32.4  26.0 - 34.0 pg Final    MCHC 03/19/2024 32.7  32.0 - 36.0 g/dL Final    RDW 03/19/2024 13.7  11.5 - 14.5 % Final    Platelets 03/19/2024 339  150 - 450 x10*3/uL Final    Neutrophils % 03/19/2024 60.6  40.0 - 80.0 % Final    Immature Granulocytes %, Automated 03/19/2024 0.4  0.0 - 0.9 % Final    Lymphocytes % 03/19/2024 27.6  13.0 - 44.0 % Final    Monocytes % 03/19/2024 7.4  2.0 - 10.0 % Final    Eosinophils % 03/19/2024 3.1  0.0 - 6.0 % Final    Basophils % 03/19/2024 0.9  0.0 - 2.0 % Final    Neutrophils Absolute 03/19/2024 7.83 (H)  1.20 - 7.70 x10*3/uL Final    Immature Granulocytes Absolute, Au* 03/19/2024 0.05  0.00 - 0.70 x10*3/uL Final    " Lymphocytes Absolute 03/19/2024 3.56  1.20 - 4.80 x10*3/uL Final    Monocytes Absolute 03/19/2024 0.96  0.10 - 1.00 x10*3/uL Final    Eosinophils Absolute 03/19/2024 0.40  0.00 - 0.70 x10*3/uL Final    Basophils Absolute 03/19/2024 0.12 (H)  0.00 - 0.10 x10*3/uL Final       Radiology: Reviewed imaging in powerchart.  No results found.      Charting was completed using voice recognition technology and may include unintended errors.

## 2024-11-05 ENCOUNTER — LAB (OUTPATIENT)
Dept: LAB | Facility: LAB | Age: 69
End: 2024-11-05
Payer: COMMERCIAL

## 2024-11-05 DIAGNOSIS — J44.9 COPD MIXED TYPE (MULTI): ICD-10-CM

## 2024-11-05 DIAGNOSIS — E11.49 DIABETES MELLITUS TYPE 2 WITH NEUROLOGICAL MANIFESTATIONS (MULTI): ICD-10-CM

## 2024-11-05 DIAGNOSIS — I73.9 PAD (PERIPHERAL ARTERY DISEASE) (CMS-HCC): ICD-10-CM

## 2024-11-05 DIAGNOSIS — Z12.2 ENCOUNTER FOR SCREENING FOR LUNG CANCER: ICD-10-CM

## 2024-11-05 DIAGNOSIS — N40.0 BENIGN PROSTATIC HYPERPLASIA WITHOUT LOWER URINARY TRACT SYMPTOMS: ICD-10-CM

## 2024-11-05 DIAGNOSIS — Z13.6 SCREENING FOR ABDOMINAL AORTIC ANEURYSM: ICD-10-CM

## 2024-11-05 DIAGNOSIS — E03.9 ACQUIRED HYPOTHYROIDISM: ICD-10-CM

## 2024-11-05 DIAGNOSIS — F17.200 SMOKER: ICD-10-CM

## 2024-11-05 LAB
ALBUMIN SERPL BCP-MCNC: 4 G/DL (ref 3.4–5)
ALP SERPL-CCNC: 78 U/L (ref 33–136)
ALT SERPL W P-5'-P-CCNC: 19 U/L (ref 10–52)
ANION GAP SERPL CALC-SCNC: 12 MMOL/L (ref 10–20)
AST SERPL W P-5'-P-CCNC: 16 U/L (ref 9–39)
BILIRUB SERPL-MCNC: 0.6 MG/DL (ref 0–1.2)
BUN SERPL-MCNC: 19 MG/DL (ref 6–23)
CALCIUM SERPL-MCNC: 9.9 MG/DL (ref 8.6–10.6)
CHLORIDE SERPL-SCNC: 106 MMOL/L (ref 98–107)
CO2 SERPL-SCNC: 24 MMOL/L (ref 21–32)
CREAT SERPL-MCNC: 1.05 MG/DL (ref 0.5–1.3)
CREAT UR-MCNC: 118.8 MG/DL (ref 20–370)
EGFRCR SERPLBLD CKD-EPI 2021: 76 ML/MIN/1.73M*2
EST. AVERAGE GLUCOSE BLD GHB EST-MCNC: 134 MG/DL
GLUCOSE SERPL-MCNC: 119 MG/DL (ref 74–99)
HBA1C MFR BLD: 6.3 %
INR PPP: 1.4 (ref 0.9–1.1)
MAGNESIUM SERPL-MCNC: 2.1 MG/DL (ref 1.6–2.4)
MICROALBUMIN UR-MCNC: 45.5 MG/L
MICROALBUMIN/CREAT UR: 38.3 UG/MG CREAT
POTASSIUM SERPL-SCNC: 4.3 MMOL/L (ref 3.5–5.3)
PROT SERPL-MCNC: 6.9 G/DL (ref 6.4–8.2)
PROTHROMBIN TIME: 15.6 SECONDS (ref 9.8–12.8)
SODIUM SERPL-SCNC: 138 MMOL/L (ref 136–145)
URATE SERPL-MCNC: 4.6 MG/DL (ref 4–7.5)

## 2024-11-05 PROCEDURE — 83735 ASSAY OF MAGNESIUM: CPT

## 2024-11-05 PROCEDURE — 83036 HEMOGLOBIN GLYCOSYLATED A1C: CPT

## 2024-11-05 PROCEDURE — 36415 COLL VENOUS BLD VENIPUNCTURE: CPT

## 2024-11-05 PROCEDURE — 82043 UR ALBUMIN QUANTITATIVE: CPT

## 2024-11-05 PROCEDURE — 87086 URINE CULTURE/COLONY COUNT: CPT

## 2024-11-05 PROCEDURE — 82570 ASSAY OF URINE CREATININE: CPT

## 2024-11-05 PROCEDURE — 84153 ASSAY OF PSA TOTAL: CPT

## 2024-11-05 PROCEDURE — 80053 COMPREHEN METABOLIC PANEL: CPT

## 2024-11-05 PROCEDURE — 85610 PROTHROMBIN TIME: CPT

## 2024-11-05 PROCEDURE — 84443 ASSAY THYROID STIM HORMONE: CPT

## 2024-11-05 PROCEDURE — 84550 ASSAY OF BLOOD/URIC ACID: CPT

## 2024-11-06 LAB
BACTERIA UR CULT: NO GROWTH
PSA SERPL-MCNC: 0.46 NG/ML
TSH SERPL-ACNC: 3.79 MIU/L (ref 0.44–3.98)

## 2024-11-07 ENCOUNTER — TELEPHONE (OUTPATIENT)
Dept: PRIMARY CARE | Facility: CLINIC | Age: 69
End: 2024-11-07
Payer: COMMERCIAL

## 2024-11-07 NOTE — TELEPHONE ENCOUNTER
----- Message from Pham Damian sent at 11/7/2024  9:30 AM EST -----  Proteinuria hemoglobin A1c 6.3 glucose 119 INR 1.4 thyroid normal prostate normal uric acid normal continue current medication follow-up

## 2024-11-25 ENCOUNTER — TELEPHONE (OUTPATIENT)
Dept: PRIMARY CARE | Facility: CLINIC | Age: 69
End: 2024-11-25
Payer: COMMERCIAL

## 2025-01-08 ENCOUNTER — TELEMEDICINE (OUTPATIENT)
Dept: PRIMARY CARE | Facility: CLINIC | Age: 70
End: 2025-01-08
Payer: COMMERCIAL

## 2025-01-08 VITALS
HEART RATE: 72 BPM | BODY MASS INDEX: 23.04 KG/M2 | DIASTOLIC BLOOD PRESSURE: 67 MMHG | SYSTOLIC BLOOD PRESSURE: 157 MMHG | HEIGHT: 69 IN | TEMPERATURE: 96.8 F

## 2025-01-08 DIAGNOSIS — I73.9 PAD (PERIPHERAL ARTERY DISEASE) (CMS-HCC): ICD-10-CM

## 2025-01-08 DIAGNOSIS — E11.49 DIABETES MELLITUS TYPE 2 WITH NEUROLOGICAL MANIFESTATIONS (MULTI): ICD-10-CM

## 2025-01-08 DIAGNOSIS — J44.9 COPD MIXED TYPE (MULTI): ICD-10-CM

## 2025-01-08 DIAGNOSIS — E11.69 DM TYPE 2 WITH DIABETIC MIXED HYPERLIPIDEMIA (MULTI): ICD-10-CM

## 2025-01-08 DIAGNOSIS — E78.2 DM TYPE 2 WITH DIABETIC MIXED HYPERLIPIDEMIA (MULTI): ICD-10-CM

## 2025-01-08 DIAGNOSIS — N40.0 BENIGN PROSTATIC HYPERPLASIA WITHOUT LOWER URINARY TRACT SYMPTOMS: ICD-10-CM

## 2025-01-08 DIAGNOSIS — A49.9 BACTERIAL INFECTION: Primary | ICD-10-CM

## 2025-01-08 PROCEDURE — 99213 OFFICE O/P EST LOW 20 MIN: CPT | Performed by: EMERGENCY MEDICINE

## 2025-01-08 PROCEDURE — 1157F ADVNC CARE PLAN IN RCRD: CPT | Performed by: EMERGENCY MEDICINE

## 2025-01-08 PROCEDURE — 3077F SYST BP >= 140 MM HG: CPT | Performed by: EMERGENCY MEDICINE

## 2025-01-08 PROCEDURE — 4010F ACE/ARB THERAPY RXD/TAKEN: CPT | Performed by: EMERGENCY MEDICINE

## 2025-01-08 PROCEDURE — 3078F DIAST BP <80 MM HG: CPT | Performed by: EMERGENCY MEDICINE

## 2025-01-08 PROCEDURE — 1123F ACP DISCUSS/DSCN MKR DOCD: CPT | Performed by: EMERGENCY MEDICINE

## 2025-01-08 PROCEDURE — 1159F MED LIST DOCD IN RCRD: CPT | Performed by: EMERGENCY MEDICINE

## 2025-01-08 RX ORDER — LEVOFLOXACIN 500 MG/1
500 TABLET, FILM COATED ORAL DAILY
Qty: 10 TABLET | Refills: 0 | Status: SHIPPED | OUTPATIENT
Start: 2025-01-08 | End: 2025-01-18

## 2025-01-08 NOTE — PROGRESS NOTES
Subjective   Patient ID: Vijay Juarez is a 69 y.o. male who presents for Virtual Visit (headahce, sore throat, body aches, fever, chills, weak just got back from out of town yesterday).    Assessment/Plan   Problem List Items Addressed This Visit    None  Visit Diagnoses       Bacterial infection    -  Primary    Relevant Medications    levoFLOXacin (Levaquin) 500 mg tablet          Infection with somatic symptoms-Levaquin    Hypothyroidism-continue levothyroxine    Diabetes-on Glucophage    Anticoagulated with Coumadin    Hypertension-takes Lotensin    Hyperlipidemia-on Crestor    Follow-up as needed  Source of history: Nurse, Medical personnel, Medical record, Patient.  History limitation: None.    HPI  69-year-old    Came back from [facility]  As coughing sore throat diarrhea body aches low-grade fever      No Known Allergies    Current Outpatient Medications   Medication Sig Dispense Refill    Anoro Ellipta 62.5-25 mcg/actuation blister with device INHALE 1 PUFF BY MOUTH EVERY DAY 60 each 6    aspirin 81 mg EC tablet Take 1 tablet (81 mg) by mouth once daily.      benazepril (Lotensin) 20 mg tablet Take 1 tablet (20 mg) by mouth once daily. 90 tablet 3    cilostazol (Pletal) 100 mg tablet Take by mouth.      finasteride (Proscar) 5 mg tablet Take 1 tablet (5 mg) by mouth once daily.      ipratropium-albuteroL (Duo-Neb) 0.5-2.5 mg/3 mL nebulizer solution Take 3 mL by nebulization every 8 hours if needed for wheezing or shortness of breath. 270 mL 3    levothyroxine (Synthroid) 25 mcg tablet Take 0.5 tablets (12.5 mcg) by mouth once daily. 45 tablet 3    metFORMIN (Glucophage) 500 mg tablet Take 1 tablet (500 mg) by mouth 2 times a day with meals. 180 tablet 3    rosuvastatin (Crestor) 40 mg tablet TAKE 1 TABLET BY MOUTH EVERY DAY 90 tablet 3    tamsulosin (Flomax) 0.4 mg 24 hr capsule Take 1 capsule (0.4 mg) by mouth 2 times a day.      warfarin (Coumadin) 5 mg tablet TAKE AS DIRECTED BY COUMADIN CLINIC       "warfarin (Coumadin) 7.5 mg tablet Take by mouth.      levoFLOXacin (Levaquin) 500 mg tablet Take 1 tablet (500 mg) by mouth once daily for 10 days. 10 tablet 0     No current facility-administered medications for this visit.       Objective   Visit Vitals  /67   Pulse 72   Temp 36 °C (96.8 °F)   Ht 1.753 m (5' 9\")   BMI 23.04 kg/m²   Smoking Status Some Days   BSA 1.86 m²     Physical Exam  Vital signs as per nursing/MA documentation  General appearance: Alert and in no acute distress  HEENT: Normal Inspection  Neck - Normal Inspection  Respiratory : No respiratory distress. Lungs are clear   Cardiovascular: heart rate normal. No gallop  Back - normal inspection  Skin inspection:Warm  Musculoskeletal : No deformities  Neuro : Limited exam. Baseline    Review of Systems   Comprehensive review of systems as allowed by patient condition and nursing input is negative    Results including lab work, imaging reports were reviewed and wherever possible, independently verified    Family History   Problem Relation Name Age of Onset    Heart disease Mother      Other (back issues) Mother      No Known Problems Father       Social History     Socioeconomic History    Marital status:    Tobacco Use    Smoking status: Some Days     Types: Cigars    Smokeless tobacco: Never   Substance and Sexual Activity    Alcohol use: Yes     Alcohol/week: 0.0 - 2.0 standard drinks of alcohol    Drug use: Never     Past Medical History:   Diagnosis Date    Other pericardial effusion (noninflammatory) ([facility]) 11/18/2021    Pericardial effusion    Personal history of other diseases of male genital organs 11/18/2021    History of erectile dysfunction    Personal history of other diseases of the circulatory system 07/31/2019    History of lymphadenitis    Personal history of other diseases of the musculoskeletal system and connective tissue 07/23/2019    History of arthritis    Personal history of other diseases of the respiratory " system 02/01/2021    History of allergic rhinitis    Personal history of other diseases of the respiratory system 03/02/2020    History of sore throat    Personal history of other diseases of urinary system 10/14/2019    History of hematuria    Personal history of other endocrine, nutritional and metabolic disease 03/02/2020    History of vitamin D deficiency    Personal history of other infectious and parasitic diseases 11/07/2022    History of candidiasis of mouth     Past Surgical History:   Procedure Laterality Date    FINGER SURGERY      LEG SURGERY         Charting was completed using voice recognition technology and may include unintended errors.

## 2025-02-19 DIAGNOSIS — J44.9 CHRONIC OBSTRUCTIVE PULMONARY DISEASE, UNSPECIFIED: ICD-10-CM

## 2025-02-20 RX ORDER — UMECLIDINIUM BROMIDE AND VILANTEROL TRIFENATATE 62.5; 25 UG/1; UG/1
1 POWDER RESPIRATORY (INHALATION) DAILY
Qty: 60 EACH | Refills: 6 | Status: SHIPPED | OUTPATIENT
Start: 2025-02-20

## 2025-02-27 DIAGNOSIS — E03.9 ACQUIRED HYPOTHYROIDISM: ICD-10-CM

## 2025-02-27 DIAGNOSIS — E11.69 DM TYPE 2 WITH DIABETIC MIXED HYPERLIPIDEMIA (MULTI): ICD-10-CM

## 2025-02-27 DIAGNOSIS — E78.2 DM TYPE 2 WITH DIABETIC MIXED HYPERLIPIDEMIA (MULTI): ICD-10-CM

## 2025-02-27 RX ORDER — LEVOTHYROXINE SODIUM 25 UG/1
12.5 TABLET ORAL DAILY
Qty: 45 TABLET | Refills: 3 | Status: SHIPPED | OUTPATIENT
Start: 2025-02-27

## 2025-03-12 DIAGNOSIS — J44.9 MILD CHRONIC OBSTRUCTIVE PULMONARY DISEASE (MULTI): ICD-10-CM

## 2025-03-13 RX ORDER — IPRATROPIUM BROMIDE AND ALBUTEROL SULFATE 2.5; .5 MG/3ML; MG/3ML
3 SOLUTION RESPIRATORY (INHALATION) EVERY 8 HOURS PRN
Qty: 270 ML | Refills: 3 | Status: SHIPPED | OUTPATIENT
Start: 2025-03-13

## 2025-04-24 ENCOUNTER — OFFICE VISIT (OUTPATIENT)
Dept: PRIMARY CARE | Facility: CLINIC | Age: 70
End: 2025-04-24
Payer: COMMERCIAL

## 2025-04-24 VITALS
TEMPERATURE: 97.5 F | BODY MASS INDEX: 23.4 KG/M2 | WEIGHT: 158 LBS | SYSTOLIC BLOOD PRESSURE: 161 MMHG | DIASTOLIC BLOOD PRESSURE: 71 MMHG | OXYGEN SATURATION: 97 % | HEIGHT: 69 IN

## 2025-04-24 DIAGNOSIS — N40.0 BENIGN PROSTATIC HYPERPLASIA WITHOUT LOWER URINARY TRACT SYMPTOMS: ICD-10-CM

## 2025-04-24 DIAGNOSIS — Z87.891 EX-CIGARETTE SMOKER: ICD-10-CM

## 2025-04-24 DIAGNOSIS — E11.69 DM TYPE 2 WITH DIABETIC MIXED HYPERLIPIDEMIA (MULTI): ICD-10-CM

## 2025-04-24 DIAGNOSIS — G89.29 CHRONIC RIGHT SHOULDER PAIN: Primary | ICD-10-CM

## 2025-04-24 DIAGNOSIS — M25.511 CHRONIC RIGHT SHOULDER PAIN: Primary | ICD-10-CM

## 2025-04-24 DIAGNOSIS — I10 DIABETES MELLITUS WITH COINCIDENT HYPERTENSION (MULTI): ICD-10-CM

## 2025-04-24 DIAGNOSIS — M54.12 CERVICAL RADICULOPATHY: ICD-10-CM

## 2025-04-24 DIAGNOSIS — E78.2 DM TYPE 2 WITH DIABETIC MIXED HYPERLIPIDEMIA (MULTI): ICD-10-CM

## 2025-04-24 DIAGNOSIS — J44.9 COPD MIXED TYPE (MULTI): ICD-10-CM

## 2025-04-24 DIAGNOSIS — E11.9 DIABETES MELLITUS WITH COINCIDENT HYPERTENSION (MULTI): ICD-10-CM

## 2025-04-24 DIAGNOSIS — I10 BENIGN ESSENTIAL HYPERTENSION: ICD-10-CM

## 2025-04-24 DIAGNOSIS — F17.200 SMOKER: ICD-10-CM

## 2025-04-24 DIAGNOSIS — Z13.6 SCREENING FOR ABDOMINAL AORTIC ANEURYSM: ICD-10-CM

## 2025-04-24 DIAGNOSIS — R52 PAIN: ICD-10-CM

## 2025-04-24 DIAGNOSIS — I48.20 CHRONIC ATRIAL FIBRILLATION (MULTI): ICD-10-CM

## 2025-04-24 DIAGNOSIS — E03.9 ACQUIRED HYPOTHYROIDISM: ICD-10-CM

## 2025-04-24 DIAGNOSIS — I73.9 PAD (PERIPHERAL ARTERY DISEASE) (CMS-HCC): ICD-10-CM

## 2025-04-24 DIAGNOSIS — Z12.2 ENCOUNTER FOR SCREENING FOR LUNG CANCER: ICD-10-CM

## 2025-04-24 PROCEDURE — 4010F ACE/ARB THERAPY RXD/TAKEN: CPT | Performed by: INTERNAL MEDICINE

## 2025-04-24 PROCEDURE — 3078F DIAST BP <80 MM HG: CPT | Performed by: INTERNAL MEDICINE

## 2025-04-24 PROCEDURE — 99215 OFFICE O/P EST HI 40 MIN: CPT | Performed by: INTERNAL MEDICINE

## 2025-04-24 PROCEDURE — 96372 THER/PROPH/DIAG INJ SC/IM: CPT | Performed by: INTERNAL MEDICINE

## 2025-04-24 PROCEDURE — 3008F BODY MASS INDEX DOCD: CPT | Performed by: INTERNAL MEDICINE

## 2025-04-24 PROCEDURE — 1160F RVW MEDS BY RX/DR IN RCRD: CPT | Performed by: INTERNAL MEDICINE

## 2025-04-24 PROCEDURE — 1159F MED LIST DOCD IN RCRD: CPT | Performed by: INTERNAL MEDICINE

## 2025-04-24 PROCEDURE — 3077F SYST BP >= 140 MM HG: CPT | Performed by: INTERNAL MEDICINE

## 2025-04-24 PROCEDURE — 1157F ADVNC CARE PLAN IN RCRD: CPT | Performed by: INTERNAL MEDICINE

## 2025-04-24 PROCEDURE — 1123F ACP DISCUSS/DSCN MKR DOCD: CPT | Performed by: INTERNAL MEDICINE

## 2025-04-24 RX ORDER — TRIAMCINOLONE ACETONIDE 40 MG/ML
40 INJECTION, SUSPENSION INTRA-ARTICULAR; INTRAMUSCULAR ONCE
Status: COMPLETED | OUTPATIENT
Start: 2025-04-24 | End: 2025-04-24

## 2025-04-24 RX ORDER — OLMESARTAN MEDOXOMIL 40 MG/1
40 TABLET ORAL DAILY
Qty: 90 TABLET | Refills: 3 | Status: SHIPPED | OUTPATIENT
Start: 2025-04-24

## 2025-04-24 RX ADMIN — TRIAMCINOLONE ACETONIDE 40 MG: 40 INJECTION, SUSPENSION INTRA-ARTICULAR; INTRAMUSCULAR at 16:23

## 2025-04-24 ASSESSMENT — PATIENT HEALTH QUESTIONNAIRE - PHQ9
SUM OF ALL RESPONSES TO PHQ9 QUESTIONS 1 AND 2: 0
1. LITTLE INTEREST OR PLEASURE IN DOING THINGS: NOT AT ALL
2. FEELING DOWN, DEPRESSED OR HOPELESS: NOT AT ALL

## 2025-04-24 NOTE — PROGRESS NOTES
Subjective   Patient ID: Vijay Juarez is a 70 y.o. male who presents for Numbness (Right arm and hand slowly going numb- getting worse ).    Assessment/Plan     Problem List Items Addressed This Visit       Screening for abdominal aortic aneurysm    Relevant Orders    Albumin-Creatinine Ratio, Urine Random    CBC and Auto Differential    Comprehensive Metabolic Panel    Hemoglobin A1C    Lipid Panel    Magnesium    Uric Acid    TSH with reflex to Free T4 if abnormal    Protime-INR    Vascular US Abdominal Aorta Aneurysm AAA Screening    PAD (peripheral artery disease) (CMS-HCC)    Relevant Orders    Albumin-Creatinine Ratio, Urine Random    CBC and Auto Differential    Comprehensive Metabolic Panel    Hemoglobin A1C    Lipid Panel    Magnesium    Uric Acid    TSH with reflex to Free T4 if abnormal    Protime-INR    Smoker    Relevant Orders    Albumin-Creatinine Ratio, Urine Random    CBC and Auto Differential    Comprehensive Metabolic Panel    Hemoglobin A1C    Lipid Panel    Magnesium    Uric Acid    TSH with reflex to Free T4 if abnormal    Protime-INR    COPD mixed type (Multi)    Relevant Orders    Albumin-Creatinine Ratio, Urine Random    CBC and Auto Differential    Comprehensive Metabolic Panel    Hemoglobin A1C    Lipid Panel    Magnesium    Uric Acid    TSH with reflex to Free T4 if abnormal    Protime-INR    Diabetes mellitus with coincident hypertension (Multi)    Relevant Orders    Albumin-Creatinine Ratio, Urine Random    CBC and Auto Differential    Comprehensive Metabolic Panel    Hemoglobin A1C    Lipid Panel    Magnesium    Uric Acid    TSH with reflex to Free T4 if abnormal    Protime-INR    Referral to Ophthalmology    DM type 2 with diabetic mixed hyperlipidemia (Multi)    Relevant Orders    Albumin-Creatinine Ratio, Urine Random    CBC and Auto Differential    Comprehensive Metabolic Panel    Hemoglobin A1C    Lipid Panel    Magnesium    Uric Acid    TSH with reflex to Free T4 if abnormal     Protime-INR    Acquired hypothyroidism    Relevant Orders    Albumin-Creatinine Ratio, Urine Random    CBC and Auto Differential    Comprehensive Metabolic Panel    Hemoglobin A1C    Lipid Panel    Magnesium    Uric Acid    TSH with reflex to Free T4 if abnormal    Protime-INR    Benign prostatic hyperplasia without lower urinary tract symptoms    Relevant Orders    Albumin-Creatinine Ratio, Urine Random    CBC and Auto Differential    Comprehensive Metabolic Panel    Hemoglobin A1C    Lipid Panel    Magnesium    Uric Acid    TSH with reflex to Free T4 if abnormal    Protime-INR    Chronic atrial fibrillation (Multi)    Pain    Relevant Orders    XR cervical spine 2-3 views    XR shoulder right 2+ views    Ex-cigarette smoker    Relevant Orders    CT lung screening low dose    Chronic right shoulder pain - Primary    Cervical radiculopathy     Patient was evaluated today, problem list was reviewed, problems and concerns addressed, Rx list reviewed and updated, lab and tests were noted and reviewed. Life style changes were discussed, always it works better if we eat plant based diet and plenty of fibres and roughage. Consume adequate amount of water and avoid alcohol, light to moderate physical activities and stress reduction are always beneficial for ongoing physical well being. Do not forget to have 6 to 7 hours of sleep regularly and avoid late night jessica screen exposure.    HPILarrarjun Juarez is a 70 y.o. male who presents for Numbness (Right arm and hand slowly going numb- getting wor 70-year-old patient who had a PAD CAD diabetes hypertension hyperlipidemia chronic A-fib seen by the pulmonary vascular cardiology service complaint acute on chronic right-sided neck pain shoulder pain also the tingling numbness weakness onset gradual duration few months progress slowly aggravating factor diabetes cervical radiculopathy bursitis and old age    Also complaining of the discolorations of the tongues and dry cough and  "wheezing after taking the Lotensin    Negative for angioedema    Negative for hypoxia hypoglycemia hypotension or fall    Negative for suicide    Discussed with the patient advised to go ahead stop smoking do the low-dose CAT scan of the lung heart and ultrasound of the aorta given x-ray of the cervical spine x-ray of the right shoulder physical therapy sent for blood test discontinue Lotensin because of the dry cough and start with the Benicar 40 mg a day monitor BMP    Because of the right-sided shoulder pain given Kenalog injection 40 to the right shoulder without any complication    Cervical radiculopathy given gabapentin 100 mg a day follow-up if problems continue does not get any better see Dr. Cordova  Medical History[1]  Surgical History[2]  Allergies[3]  Current Medications[4]  Family History[5]  Social History[6]  Immunization History   Administered Date(s) Administered    COVID-19, mRNA, LNP-S, PF, 30 mcg/0.3 mL dose 03/25/2021, 04/15/2021    Flu vaccine (IIV4), preservative free *Check age/dose* 11/06/2020, 12/01/2020    Flu vaccine, quadrivalent, high-dose, preservative free, age 65y+ (FLUZONE) 11/06/2020    Influenza, Unspecified 11/06/2020    Pfizer Purple Cap SARS-CoV-2 11/26/2021    Td vaccine, age 7 years and older (TDVAX) 12/13/2023    Zoster vaccine, recombinant, adult (SHINGRIX) 02/01/2021, 05/06/2021    Zoster, Unspecified 02/01/2021, 05/06/2021       Review of Systems  Review of systems is otherwise negative unless stated above or in history of present illness.    Objective   Visit Vitals  /71 (BP Location: Left arm, Patient Position: Sitting)   Temp 36.4 °C (97.5 °F)   Ht 1.753 m (5' 9\")   Wt 71.7 kg (158 lb)   SpO2 97%   BMI 23.33 kg/m²   Smoking Status Some Days   BSA 1.87 m²     Physical Exam  Constitutional: Anxiety     General: not in acute distress.   HENT: Runny nose     Head: Normocephalic and atraumatic.      Nose: Nose normal.   Eyes: Eyeglasses     Extraocular Movements: " Extraocular movements intact.      Conjunctiva/sclera: Conjunctivae normal.   Cardiovascular: Irregular     Rate and Rhythm: Normal rate ,  No M/R/G  Pulmonary: Crackles rales rhonchi     Effort: Pulmonary effort is normal.      Breath sounds: Normal, Bilat Equal AE  Skin: Dry skin cervical radiculopathy more on the right compared to left     General: Skin is warm.   Neurological:      Mental Status: He is alert and oriented to person, place, and time.   Psychiatric:         Mood and Affect: Mood normal.         Behavior: Behavior normal.   Musculoskeletal advanced arthritis bursitis right shoulder  FROM in all extremitirs,  Joint-no swelling or tenderness    No visits with results within 4 Month(s) from this visit.   Latest known visit with results is:   Lab on 11/05/2024   Component Date Value Ref Range Status    Albumin, Urine Random 11/05/2024 45.5  Not established mg/L Final    Creatinine, Urine Random 11/05/2024 118.8  20.0 - 370.0 mg/dL Final    Albumin/Creatinine Ratio 11/05/2024 38.3 (H)  <30.0 ug/mg Creat Final    Glucose 11/05/2024 119 (H)  74 - 99 mg/dL Final    Sodium 11/05/2024 138  136 - 145 mmol/L Final    Potassium 11/05/2024 4.3  3.5 - 5.3 mmol/L Final    Chloride 11/05/2024 106  98 - 107 mmol/L Final    Bicarbonate 11/05/2024 24  21 - 32 mmol/L Final    Anion Gap 11/05/2024 12  10 - 20 mmol/L Final    Urea Nitrogen 11/05/2024 19  6 - 23 mg/dL Final    Creatinine 11/05/2024 1.05  0.50 - 1.30 mg/dL Final    eGFR 11/05/2024 76  >60 mL/min/1.73m*2 Final    Calcium 11/05/2024 9.9  8.6 - 10.6 mg/dL Final    Albumin 11/05/2024 4.0  3.4 - 5.0 g/dL Final    Alkaline Phosphatase 11/05/2024 78  33 - 136 U/L Final    Total Protein 11/05/2024 6.9  6.4 - 8.2 g/dL Final    AST 11/05/2024 16  9 - 39 U/L Final    Bilirubin, Total 11/05/2024 0.6  0.0 - 1.2 mg/dL Final    ALT 11/05/2024 19  10 - 52 U/L Final    Magnesium 11/05/2024 2.10  1.60 - 2.40 mg/dL Final    Hemoglobin A1C 11/05/2024 6.3 (H)  See comment %  Final    Estimated Average Glucose 11/05/2024 134  Not Established mg/dL Final    Uric Acid 11/05/2024 4.6  4.0 - 7.5 mg/dL Final    Thyroid Stimulating Hormone 11/05/2024 3.79  0.44 - 3.98 mIU/L Final    Protime 11/05/2024 15.6 (H)  9.8 - 12.8 seconds Final    INR 11/05/2024 1.4 (H)  0.9 - 1.1 Final    Prostate Specific Antigen,Screen 11/05/2024 0.46  <=4.00 ng/mL Final    Urine Culture 11/05/2024 No growth   Final       Radiology: Reviewed imaging in powerchart.  Imaging  No results found.    Cardiology, Vascular, and Other Imaging  No other imaging results found for the past 7 days        Charting was completed using voice recognition technology and may include unintended errors.            [1]   Past Medical History:  Diagnosis Date    Other pericardial effusion (noninflammatory) ([facility]) 11/18/2021    Pericardial effusion    Personal history of other diseases of male genital organs 11/18/2021    History of erectile dysfunction    Personal history of other diseases of the circulatory system 07/31/2019    History of lymphadenitis    Personal history of other diseases of the musculoskeletal system and connective tissue 07/23/2019    History of arthritis    Personal history of other diseases of the respiratory system 02/01/2021    History of allergic rhinitis    Personal history of other diseases of the respiratory system 03/02/2020    History of sore throat    Personal history of other diseases of urinary system 10/14/2019    History of hematuria    Personal history of other endocrine, nutritional and metabolic disease 03/02/2020    History of vitamin D deficiency    Personal history of other infectious and parasitic diseases 11/07/2022    History of candidiasis of mouth   [2]   Past Surgical History:  Procedure Laterality Date    FINGER SURGERY      LEG SURGERY     [3] No Known Allergies  [4]   Current Outpatient Medications   Medication Sig Dispense Refill    Anoro Ellipta 62.5-25 mcg/actuation blister with  device INHALE 1 PUFF BY MOUTH EVERY DAY 60 each 6    aspirin 81 mg EC tablet Take 1 tablet (81 mg) by mouth once daily.      benazepril (Lotensin) 20 mg tablet Take 1 tablet (20 mg) by mouth once daily. 90 tablet 3    cilostazol (Pletal) 100 mg tablet Take by mouth.      finasteride (Proscar) 5 mg tablet Take 1 tablet (5 mg) by mouth once daily.      ipratropium-albuteroL (Duo-Neb) 0.5-2.5 mg/3 mL nebulizer solution TAKE 3 ML BY NEBULIZATION EVERY 8 HOURS IF NEEDED FOR WHEEZING OR SHORTNESS OF BREATH. 270 mL 3    levothyroxine (Synthroid, Levoxyl) 25 mcg tablet TAKE 0.5 TABLETS (12.5 MCG) BY MOUTH ONCE DAILY. 45 tablet 3    metFORMIN (Glucophage) 500 mg tablet Take 1 tablet (500 mg) by mouth 2 times a day with meals. 180 tablet 3    rosuvastatin (Crestor) 40 mg tablet TAKE 1 TABLET BY MOUTH EVERY DAY 90 tablet 3    tamsulosin (Flomax) 0.4 mg 24 hr capsule Take 1 capsule (0.4 mg) by mouth 2 times a day.      warfarin (Coumadin) 5 mg tablet TAKE AS DIRECTED BY COUMADIN CLINIC      warfarin (Coumadin) 7.5 mg tablet Take by mouth.       No current facility-administered medications for this visit.   [5]   Family History  Problem Relation Name Age of Onset    Heart disease Mother      Other (back issues) Mother      No Known Problems Father     [6]   Social History  Socioeconomic History    Marital status:    Tobacco Use    Smoking status: Some Days     Types: Cigars    Smokeless tobacco: Never   Substance and Sexual Activity    Alcohol use: Yes     Alcohol/week: 0.0 - 2.0 standard drinks of alcohol    Drug use: Never

## 2025-05-05 ENCOUNTER — TELEPHONE (OUTPATIENT)
Dept: PRIMARY CARE | Facility: CLINIC | Age: 70
End: 2025-05-05
Payer: COMMERCIAL

## 2025-05-05 DIAGNOSIS — G89.29 CHRONIC RIGHT SHOULDER PAIN: ICD-10-CM

## 2025-05-05 DIAGNOSIS — M54.12 CERVICAL RADICULOPATHY: ICD-10-CM

## 2025-05-05 DIAGNOSIS — M25.511 CHRONIC RIGHT SHOULDER PAIN: ICD-10-CM

## 2025-05-05 RX ORDER — METHYLPREDNISOLONE 4 MG/1
TABLET ORAL
Qty: 21 TABLET | Refills: 0 | Status: SHIPPED | OUTPATIENT
Start: 2025-05-05 | End: 2025-05-11

## 2025-05-05 NOTE — TELEPHONE ENCOUNTER
----- Message from Pham Damian sent at 5/5/2025  2:52 PM EDT -----  Arthritis of the spine with a disc disease advised physical therapy Medrol Dosepak follow-up advised to come for Medicare annual wellness checkup next visit bring all medicine with you  ----- Message -----  From: Vernell Mcnair - Imaging Results In  Sent: 5/5/2025   2:06 PM EDT  To: Pham Damian MD

## 2025-05-08 ENCOUNTER — TELEPHONE (OUTPATIENT)
Dept: PRIMARY CARE | Facility: CLINIC | Age: 70
End: 2025-05-08
Payer: COMMERCIAL

## 2025-05-08 NOTE — TELEPHONE ENCOUNTER
Patient called back and dr's message was given. He said he already picked up the medrol dose ivan and to hold of on the physical therapy because he is seeing a dr tomorrow in regards to maybe having a nerve block

## 2025-05-08 NOTE — TELEPHONE ENCOUNTER
----- Message from Pham Damian sent at 5/8/2025 11:34 AM EDT -----  Arthritis advised Medrol Dosepak physical therapy  ----- Message -----  From: Vernell Mcnair - Imaging Results In  Sent: 5/7/2025  10:21 AM EDT  To: Pham Damian MD

## 2025-05-22 ENCOUNTER — TELEPHONE (OUTPATIENT)
Dept: PRIMARY CARE | Facility: CLINIC | Age: 70
End: 2025-05-22
Payer: COMMERCIAL

## 2025-05-22 DIAGNOSIS — R91.1 LUNG NODULE: ICD-10-CM

## 2025-05-22 NOTE — TELEPHONE ENCOUNTER
----- Message from Pham Damian sent at 5/22/2025 10:26 AM EDT -----  Mild emphysema advised to get a PFT done Dr. Martinez to read    Benign 3 mm lung nodule advised follow-up with the lung nodule clinic once a year  ----- Message -----  From: Vernell Mcnair - Imaging Results In  Sent: 5/21/2025   3:35 PM EDT  To: Pham Damian MD

## 2025-07-18 ENCOUNTER — APPOINTMENT (OUTPATIENT)
Dept: PRIMARY CARE | Facility: CLINIC | Age: 70
End: 2025-07-18
Payer: COMMERCIAL

## 2025-07-24 ENCOUNTER — APPOINTMENT (OUTPATIENT)
Dept: PRIMARY CARE | Facility: CLINIC | Age: 70
End: 2025-07-24
Payer: COMMERCIAL

## 2025-07-29 ENCOUNTER — OFFICE VISIT (OUTPATIENT)
Dept: PRIMARY CARE | Facility: CLINIC | Age: 70
End: 2025-07-29
Payer: COMMERCIAL

## 2025-07-29 VITALS
BODY MASS INDEX: 22.93 KG/M2 | DIASTOLIC BLOOD PRESSURE: 75 MMHG | HEART RATE: 74 BPM | OXYGEN SATURATION: 98 % | WEIGHT: 154.8 LBS | HEIGHT: 69 IN | TEMPERATURE: 97.3 F | SYSTOLIC BLOOD PRESSURE: 130 MMHG

## 2025-07-29 DIAGNOSIS — E78.2 DM TYPE 2 WITH DIABETIC MIXED HYPERLIPIDEMIA (MULTI): ICD-10-CM

## 2025-07-29 DIAGNOSIS — I10 DIABETES MELLITUS WITH COINCIDENT HYPERTENSION (MULTI): ICD-10-CM

## 2025-07-29 DIAGNOSIS — J44.9 COPD MIXED TYPE (MULTI): ICD-10-CM

## 2025-07-29 DIAGNOSIS — F17.200 SMOKER: ICD-10-CM

## 2025-07-29 DIAGNOSIS — I73.9 PAD (PERIPHERAL ARTERY DISEASE): ICD-10-CM

## 2025-07-29 DIAGNOSIS — Z87.891 AGGRESSIVE FORMER SMOKER: ICD-10-CM

## 2025-07-29 DIAGNOSIS — M54.12 CERVICAL RADICULOPATHY: ICD-10-CM

## 2025-07-29 DIAGNOSIS — Z87.891 EX-CIGARETTE SMOKER: ICD-10-CM

## 2025-07-29 DIAGNOSIS — E11.9 DIABETES MELLITUS WITH COINCIDENT HYPERTENSION (MULTI): ICD-10-CM

## 2025-07-29 DIAGNOSIS — N30.00 ACUTE CYSTITIS WITHOUT HEMATURIA: ICD-10-CM

## 2025-07-29 DIAGNOSIS — E03.9 ACQUIRED HYPOTHYROIDISM: ICD-10-CM

## 2025-07-29 DIAGNOSIS — N40.0 BENIGN PROSTATIC HYPERPLASIA WITHOUT LOWER URINARY TRACT SYMPTOMS: ICD-10-CM

## 2025-07-29 DIAGNOSIS — E11.49 DIABETES MELLITUS TYPE 2 WITH NEUROLOGICAL MANIFESTATIONS (MULTI): ICD-10-CM

## 2025-07-29 DIAGNOSIS — Z00.00 MEDICARE ANNUAL WELLNESS VISIT, SUBSEQUENT: Primary | ICD-10-CM

## 2025-07-29 DIAGNOSIS — F01.50 VASCULAR DEMENTIA WITHOUT BEHAVIORAL DISTURBANCE, PSYCHOTIC DISTURBANCE, MOOD DISTURBANCE, OR ANXIETY, UNSPECIFIED DEMENTIA SEVERITY (MULTI): ICD-10-CM

## 2025-07-29 DIAGNOSIS — E11.69 DM TYPE 2 WITH DIABETIC MIXED HYPERLIPIDEMIA (MULTI): ICD-10-CM

## 2025-07-29 DIAGNOSIS — F03.90 DEMENTIA, UNSPECIFIED DEMENTIA SEVERITY, UNSPECIFIED DEMENTIA TYPE, UNSPECIFIED WHETHER BEHAVIORAL, PSYCHOTIC, OR MOOD DISTURBANCE OR ANXIETY (MULTI): ICD-10-CM

## 2025-07-29 DIAGNOSIS — Z13.6 SCREENING FOR ABDOMINAL AORTIC ANEURYSM: ICD-10-CM

## 2025-07-29 DIAGNOSIS — I48.20 CHRONIC ATRIAL FIBRILLATION (MULTI): ICD-10-CM

## 2025-07-29 DIAGNOSIS — R05.9 COUGH, UNSPECIFIED TYPE: ICD-10-CM

## 2025-07-29 PROBLEM — G89.29 CHRONIC RIGHT SHOULDER PAIN: Status: RESOLVED | Noted: 2025-04-24 | Resolved: 2025-07-29

## 2025-07-29 PROBLEM — R52 PAIN: Status: RESOLVED | Noted: 2025-04-24 | Resolved: 2025-07-29

## 2025-07-29 PROBLEM — M25.511 CHRONIC RIGHT SHOULDER PAIN: Status: RESOLVED | Noted: 2025-04-24 | Resolved: 2025-07-29

## 2025-07-29 PROCEDURE — 99497 ADVNCD CARE PLAN 30 MIN: CPT | Performed by: INTERNAL MEDICINE

## 2025-07-29 PROCEDURE — 3078F DIAST BP <80 MM HG: CPT | Performed by: INTERNAL MEDICINE

## 2025-07-29 PROCEDURE — 1170F FXNL STATUS ASSESSED: CPT | Performed by: INTERNAL MEDICINE

## 2025-07-29 PROCEDURE — 99214 OFFICE O/P EST MOD 30 MIN: CPT | Performed by: INTERNAL MEDICINE

## 2025-07-29 PROCEDURE — 4010F ACE/ARB THERAPY RXD/TAKEN: CPT | Performed by: INTERNAL MEDICINE

## 2025-07-29 PROCEDURE — 1160F RVW MEDS BY RX/DR IN RCRD: CPT | Performed by: INTERNAL MEDICINE

## 2025-07-29 PROCEDURE — 3008F BODY MASS INDEX DOCD: CPT | Performed by: INTERNAL MEDICINE

## 2025-07-29 PROCEDURE — G0439 PPPS, SUBSEQ VISIT: HCPCS | Performed by: INTERNAL MEDICINE

## 2025-07-29 PROCEDURE — 3075F SYST BP GE 130 - 139MM HG: CPT | Performed by: INTERNAL MEDICINE

## 2025-07-29 PROCEDURE — 1159F MED LIST DOCD IN RCRD: CPT | Performed by: INTERNAL MEDICINE

## 2025-07-29 RX ORDER — DONEPEZIL HYDROCHLORIDE 5 MG/1
5 TABLET, FILM COATED ORAL EVERY MORNING
Qty: 30 TABLET | Refills: 5 | Status: SHIPPED | OUTPATIENT
Start: 2025-07-29

## 2025-07-29 RX ORDER — MEMANTINE HYDROCHLORIDE 5 MG/1
5 TABLET ORAL DAILY
Qty: 30 TABLET | Refills: 5 | Status: SHIPPED | OUTPATIENT
Start: 2025-07-29

## 2025-07-29 ASSESSMENT — ACTIVITIES OF DAILY LIVING (ADL)
DOING_HOUSEWORK: INDEPENDENT
BATHING: INDEPENDENT
DRESSING: INDEPENDENT
MANAGING_FINANCES: INDEPENDENT
GROCERY_SHOPPING: INDEPENDENT
TAKING_MEDICATION: INDEPENDENT

## 2025-07-29 ASSESSMENT — PATIENT HEALTH QUESTIONNAIRE - PHQ9
1. LITTLE INTEREST OR PLEASURE IN DOING THINGS: SEVERAL DAYS
2. FEELING DOWN, DEPRESSED OR HOPELESS: NOT AT ALL
1. LITTLE INTEREST OR PLEASURE IN DOING THINGS: NOT AT ALL
5. POOR APPETITE OR OVEREATING: NOT AT ALL
7. TROUBLE CONCENTRATING ON THINGS, SUCH AS READING THE NEWSPAPER OR WATCHING TELEVISION: NOT AT ALL
6. FEELING BAD ABOUT YOURSELF - OR THAT YOU ARE A FAILURE OR HAVE LET YOURSELF OR YOUR FAMILY DOWN: NOT AT ALL
SUM OF ALL RESPONSES TO PHQ9 QUESTIONS 1 AND 2: 0
2. FEELING DOWN, DEPRESSED OR HOPELESS: SEVERAL DAYS
4. FEELING TIRED OR HAVING LITTLE ENERGY: NOT AT ALL
SUM OF ALL RESPONSES TO PHQ9 QUESTIONS 1 AND 2: 2
3. TROUBLE FALLING OR STAYING ASLEEP OR SLEEPING TOO MUCH: SEVERAL DAYS
8. MOVING OR SPEAKING SO SLOWLY THAT OTHER PEOPLE COULD HAVE NOTICED. OR THE OPPOSITE, BEING SO FIGETY OR RESTLESS THAT YOU HAVE BEEN MOVING AROUND A LOT MORE THAN USUAL: NOT AT ALL

## 2025-07-29 NOTE — ASSESSMENT & PLAN NOTE
Diabetes with complication retinopathy neuropathy nephropathy cardiomyopathy advised to continue aspirin 81 mg a day    100 mg a day Benicar 40 mg a day Crestor 40 mg a day Coumadin as per Coumadin clinic refer patient to dentist ophthalmology cardiology vascular    Check lipid hemoglobin A1c Spot urine microalbuminuria twice a year follow-up 3 times a year

## 2025-07-29 NOTE — PROGRESS NOTES
Subjective   Patient ID: Vijay Juarez is a 70 y.o. male who presents for Medicare Annual Wellness Visit Subsequent and Memory Loss.    Assessment/Plan     Problem List Items Addressed This Visit       Medicare annual wellness visit, subsequent - Primary    Relevant Orders    Albumin-Creatinine Ratio, Urine Random    CBC and Auto Differential    Comprehensive Metabolic Panel    Hemoglobin A1C    Lipid Panel    Magnesium    Urine Culture    Uric Acid    TSH with reflex to Free T4 if abnormal    Prostate Specific Antigen, Screen    PAD (peripheral artery disease)    History of smoking advised follow-up with Dr. Pearl vascular         Relevant Orders    Albumin-Creatinine Ratio, Urine Random    CBC and Auto Differential    Comprehensive Metabolic Panel    Hemoglobin A1C    Lipid Panel    Magnesium    Urine Culture    Uric Acid    TSH with reflex to Free T4 if abnormal    Prostate Specific Antigen, Screen    RESOLVED: Smoker    Relevant Orders    Albumin-Creatinine Ratio, Urine Random    CBC and Auto Differential    Comprehensive Metabolic Panel    Hemoglobin A1C    Lipid Panel    Magnesium    Urine Culture    Uric Acid    TSH with reflex to Free T4 if abnormal    Prostate Specific Antigen, Screen    COPD mixed type (Multi)    PFT advised to get flu pneumonia COVID-19 vaccines continue inhaler         Relevant Orders    Albumin-Creatinine Ratio, Urine Random    CBC and Auto Differential    Comprehensive Metabolic Panel    Hemoglobin A1C    Lipid Panel    Magnesium    Urine Culture    Uric Acid    TSH with reflex to Free T4 if abnormal    Prostate Specific Antigen, Screen    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)    Relevant Orders    Albumin-Creatinine Ratio, Urine Random    CBC and Auto Differential    Comprehensive Metabolic Panel    Hemoglobin A1C    Lipid Panel    Magnesium    Urine Culture    Uric Acid    TSH with reflex to Free T4 if abnormal    Prostate Specific Antigen, Screen     Referral to Ophthalmology    Diabetes mellitus with coincident hypertension (Multi)    Relevant Orders    Albumin-Creatinine Ratio, Urine Random    CBC and Auto Differential    Comprehensive Metabolic Panel    Hemoglobin A1C    Lipid Panel    Magnesium    Urine Culture    Uric Acid    TSH with reflex to Free T4 if abnormal    Prostate Specific Antigen, Screen    DM type 2 with diabetic mixed hyperlipidemia (Multi)    Diabetes with complication retinopathy neuropathy nephropathy cardiomyopathy advised to continue aspirin 81 mg a day    100 mg a day Benicar 40 mg a day Crestor 40 mg a day Coumadin as per Coumadin clinic refer patient to dentist ophthalmology cardiology vascular    Check lipid hemoglobin A1c Spot urine microalbuminuria twice a year follow-up 3 times a year         Relevant Orders    Albumin-Creatinine Ratio, Urine Random    CBC and Auto Differential    Comprehensive Metabolic Panel    Hemoglobin A1C    Lipid Panel    Magnesium    Urine Culture    Uric Acid    TSH with reflex to Free T4 if abnormal    Prostate Specific Antigen, Screen    Acquired hypothyroidism    Continue Synthroid 25 mcg a day check TSH twice a year         Relevant Orders    Albumin-Creatinine Ratio, Urine Random    CBC and Auto Differential    Comprehensive Metabolic Panel    Hemoglobin A1C    Lipid Panel    Magnesium    Urine Culture    Uric Acid    TSH with reflex to Free T4 if abnormal    Prostate Specific Antigen, Screen    Benign prostatic hyperplasia without lower urinary tract symptoms    Check PSA urinalysis once a year         Relevant Orders    Prostate Specific Antigen, Screen    Chronic atrial fibrillation (Multi)    Ex-cigarette smoker    I discussed smoking history/status that determined patient with criteria for lung cancer screening with a low-dose CT scan. By using shared decision  making we  determinded the patient will benefit from the screening, including discussion of benefit and harms of screening, follow-up  diagnostic testing, overt diagnosis, false positive rate, and total radiation exposure. I counseled the patient on the importance of adhering to a annul lung cancer low-dose CT scan screening, the impact off comorbidities, and inability or unwillingness to undergo's diagnosis and treatment if abnormality discovered. I provided patient an order for low-dose CT lung cancer screening    I spent 8 minutes counseling the patient on the importance of abstaining from the tobacco/cigarette smoking and  provided information about tobacco cessation intervention I provided patient an order for tobacco suggestion therapy             Cervical radiculopathy    Referred to PT OT neurology and neurosurgery for possible epidural block versus surgery of spine         Relevant Orders    Albumin-Creatinine Ratio, Urine Random    CBC and Auto Differential    Comprehensive Metabolic Panel    Hemoglobin A1C    Lipid Panel    Magnesium    Urine Culture    Uric Acid    TSH with reflex to Free T4 if abnormal    Prostate Specific Antigen, Screen    Dementia    Relevant Medications    donepezil (Aricept) 5 mg tablet    memantine (Namenda) 5 mg tablet     Other Visit Diagnoses         Acute cystitis without hematuria        Relevant Orders    Urine Culture      Cough, unspecified type        Relevant Orders    XR chest 2 views      Aggressive former smoker                HPI 70-year-old patient  with children grandchildren    Family history positive for heart disease and dementia    Personal history of ex-smoker social drinker    Personal history of PAD CAD arrhythmia COPD hypertension hyperlipidemia monitored by the pulmonary neurology and vascular cardiology service getting the physical therapy Occupational Therapy possible epidural block for the cervical lumbar radiculopathy    Monitor with Dr. Pearl going for the PVR study and ultrasound of the carotid and aorta    Complaint is anxiety depression dementia with forgetfulness  "agitations because of softer memory loss    PHQ 7 Mini-Mental score 16    Negative for Apetex hemoptysis hematuria rectal bleeding    Negative for fall    Negative for suicide    Negative for weight loss    Atherosclerotic heart disease aspirin    PAD Plectin    BPH Proscar    Hypothyroid the levothyroxine    Diabetes metformin    Proteinuria Benicar    Hyperlipidemia Crestor    COPD continue Trelegy inhaler 1 puff a day    SVT continue Coumadin aspirin and Pletal taking get 3 and GERI platelet and blood thinner monitor the CBC urine and stool color very closely    Will develop about the cardiology and vascular service and adjust the dose of medication for the underlying arrhythmia and.  PAD CAD      Medical History[1]  Surgical History[2]  Allergies[3]  Current Medications[4]  Family History[5]  Social History[6]  Immunization History   Administered Date(s) Administered    COVID-19, mRNA, LNP-S, PF, 30 mcg/0.3 mL dose 03/25/2021, 04/15/2021    Flu vaccine (IIV4), preservative free *Check age/dose* 11/06/2020, 12/01/2020    Flu vaccine, quadrivalent, high-dose, preservative free, age 65y+ (FLUZONE) 11/06/2020    Influenza, Unspecified 11/06/2020    Pfizer Purple Cap SARS-CoV-2 11/26/2021    Td vaccine, age 7 years and older (TDVAX) 12/13/2023    Zoster vaccine, recombinant, adult (SHINGRIX) 02/01/2021, 05/06/2021    Zoster, Unspecified 02/01/2021, 05/06/2021       Review of Systems  Review of systems is otherwise negative unless stated above or in history of present illness.    Objective   Visit Vitals  /75 (BP Location: Left arm, Patient Position: Sitting)   Pulse 74   Temp 36.3 °C (97.3 °F)   Ht 1.753 m (5' 9\")   Wt 70.2 kg (154 lb 12.8 oz)   SpO2 98%   BMI 22.86 kg/m²   Smoking Status Some Days   BSA 1.85 m²     Physical Exam  Constitutional: Cachexia of chronic disease     General: not in acute distress.   HENT: Carotid bruit     Head: Normocephalic and atraumatic.      Nose: Nose normal.   Eyes: Cataract   "   Extraocular Movements: Extraocular movements intact.      Conjunctiva/sclera: Conjunctivae normal.   Cardiovascular: Irregular     Rate and Rhythm: Normal rate ,  No M/R/G  Pulmonary: Crackle     Effort: Pulmonary effort is normal.      Breath sounds: Normal, Bilat Equal AE  Skin: Ischemic skin lower extremity     General: Skin is warm.   Neurological: Cervical lumbar radiculopathy     Mental Status: He is alert and oriented to person, place, and time.   Psychiatric:    Anxiety agitation     Mood and Affect: Mood normal.         Behavior: Behavior normal.   Musculoskeletal osteopenia osteoarthritis  FROM in all extremitirs,  Joint-no swelling or tenderness    PHQ 7 Mini-Mental 12 neurology exam moderate dementia with behavior problem    No visits with results within 4 Month(s) from this visit.   Latest known visit with results is:   Lab on 11/05/2024   Component Date Value Ref Range Status    Albumin, Urine Random 11/05/2024 45.5  Not established mg/L Final    Creatinine, Urine Random 11/05/2024 118.8  20.0 - 370.0 mg/dL Final    Albumin/Creatinine Ratio 11/05/2024 38.3 (H)  <30.0 ug/mg Creat Final    Glucose 11/05/2024 119 (H)  74 - 99 mg/dL Final    Sodium 11/05/2024 138  136 - 145 mmol/L Final    Potassium 11/05/2024 4.3  3.5 - 5.3 mmol/L Final    Chloride 11/05/2024 106  98 - 107 mmol/L Final    Bicarbonate 11/05/2024 24  21 - 32 mmol/L Final    Anion Gap 11/05/2024 12  10 - 20 mmol/L Final    Urea Nitrogen 11/05/2024 19  6 - 23 mg/dL Final    Creatinine 11/05/2024 1.05  0.50 - 1.30 mg/dL Final    eGFR 11/05/2024 76  >60 mL/min/1.73m*2 Final    Calcium 11/05/2024 9.9  8.6 - 10.6 mg/dL Final    Albumin 11/05/2024 4.0  3.4 - 5.0 g/dL Final    Alkaline Phosphatase 11/05/2024 78  33 - 136 U/L Final    Total Protein 11/05/2024 6.9  6.4 - 8.2 g/dL Final    AST 11/05/2024 16  9 - 39 U/L Final    Bilirubin, Total 11/05/2024 0.6  0.0 - 1.2 mg/dL Final    ALT 11/05/2024 19  10 - 52 U/L Final    Magnesium 11/05/2024 2.10   1.60 - 2.40 mg/dL Final    Hemoglobin A1C 11/05/2024 6.3 (H)  See comment % Final    Estimated Average Glucose 11/05/2024 134  Not Established mg/dL Final    Uric Acid 11/05/2024 4.6  4.0 - 7.5 mg/dL Final    Thyroid Stimulating Hormone 11/05/2024 3.79  0.44 - 3.98 mIU/L Final    Protime 11/05/2024 15.6 (H)  9.8 - 12.8 seconds Final    INR 11/05/2024 1.4 (H)  0.9 - 1.1 Final    Prostate Specific Antigen,Screen 11/05/2024 0.46  <=4.00 ng/mL Final    Urine Culture 11/05/2024 No growth   Final       Radiology: Reviewed imaging in powerchart.  Imaging  No results found.    Cardiology, Vascular, and Other Imaging  No other imaging results found for the past 7 days        Charting was completed using voice recognition technology and may include unintended errors.            [1]   Past Medical History:  Diagnosis Date    Other pericardial effusion (noninflammatory) ([facility]) 11/18/2021    Pericardial effusion    Personal history of other diseases of male genital organs 11/18/2021    History of erectile dysfunction    Personal history of other diseases of the circulatory system 07/31/2019    History of lymphadenitis    Personal history of other diseases of the musculoskeletal system and connective tissue 07/23/2019    History of arthritis    Personal history of other diseases of the respiratory system 02/01/2021    History of allergic rhinitis    Personal history of other diseases of the respiratory system 03/02/2020    History of sore throat    Personal history of other diseases of urinary system 10/14/2019    History of hematuria    Personal history of other endocrine, nutritional and metabolic disease 03/02/2020    History of vitamin D deficiency    Personal history of other infectious and parasitic diseases 11/07/2022    History of candidiasis of mouth   [2]   Past Surgical History:  Procedure Laterality Date    FINGER SURGERY      LEG SURGERY     [3] No Known Allergies  [4]   Current Outpatient Medications    Medication Sig Dispense Refill    Anoro Ellipta 62.5-25 mcg/actuation blister with device INHALE 1 PUFF BY MOUTH EVERY DAY 60 each 6    aspirin 81 mg EC tablet Take 1 tablet (81 mg) by mouth once daily.      cilostazol (Pletal) 100 mg tablet Take by mouth.      finasteride (Proscar) 5 mg tablet Take 1 tablet (5 mg) by mouth once daily.      ipratropium-albuteroL (Duo-Neb) 0.5-2.5 mg/3 mL nebulizer solution TAKE 3 ML BY NEBULIZATION EVERY 8 HOURS IF NEEDED FOR WHEEZING OR SHORTNESS OF BREATH. 270 mL 3    levothyroxine (Synthroid, Levoxyl) 25 mcg tablet TAKE 0.5 TABLETS (12.5 MCG) BY MOUTH ONCE DAILY. 45 tablet 3    metFORMIN (Glucophage) 500 mg tablet Take 1 tablet (500 mg) by mouth 2 times a day with meals. 180 tablet 3    olmesartan (BENIcar) 40 mg tablet Take 1 tablet (40 mg) by mouth once daily. 90 tablet 3    rosuvastatin (Crestor) 40 mg tablet TAKE 1 TABLET BY MOUTH EVERY DAY 90 tablet 3    tamsulosin (Flomax) 0.4 mg 24 hr capsule Take 1 capsule (0.4 mg) by mouth 2 times a day.      warfarin (Coumadin) 5 mg tablet TAKE AS DIRECTED BY COUMADIN CLINIC      warfarin (Coumadin) 7.5 mg tablet Take by mouth.      donepezil (Aricept) 5 mg tablet Take 1 tablet (5 mg) by mouth once daily in the morning. 30 tablet 5    memantine (Namenda) 5 mg tablet Take 1 tablet (5 mg) by mouth once daily. 30 tablet 5     No current facility-administered medications for this visit.   [5]   Family History  Problem Relation Name Age of Onset    Heart disease Mother      Other (back issues) Mother      No Known Problems Father     [6]   Social History  Socioeconomic History    Marital status:    Tobacco Use    Smoking status: Some Days     Types: Cigars    Smokeless tobacco: Never   Substance and Sexual Activity    Alcohol use: Yes     Alcohol/week: 0.0 - 2.0 standard drinks of alcohol    Drug use: Never

## 2025-07-30 ENCOUNTER — RESULTS FOLLOW-UP (OUTPATIENT)
Dept: PRIMARY CARE | Facility: CLINIC | Age: 70
End: 2025-07-30
Payer: COMMERCIAL

## 2025-07-30 ENCOUNTER — TELEPHONE (OUTPATIENT)
Dept: PRIMARY CARE | Facility: CLINIC | Age: 70
End: 2025-07-30
Payer: COMMERCIAL

## 2025-07-30 NOTE — TELEPHONE ENCOUNTER
Patient's significant other called, wants a call back to figure out medications. Prefers after 9:30-10.    Tammy- [phone]

## 2025-07-31 LAB
ALBUMIN SERPL-MCNC: 3.9 G/DL (ref 3.6–5.1)
ALBUMIN/CREAT UR: 61 MG/G CREAT
ALP SERPL-CCNC: 80 U/L (ref 35–144)
ALT SERPL-CCNC: 17 U/L (ref 9–46)
ANION GAP SERPL CALCULATED.4IONS-SCNC: 6 MMOL/L (CALC) (ref 7–17)
AST SERPL-CCNC: 17 U/L (ref 10–35)
BACTERIA UR CULT: NORMAL
BASOPHILS # BLD AUTO: 85 CELLS/UL (ref 0–200)
BASOPHILS NFR BLD AUTO: 0.8 %
BILIRUB SERPL-MCNC: 0.4 MG/DL (ref 0.2–1.2)
BUN SERPL-MCNC: 12 MG/DL (ref 7–25)
CALCIUM SERPL-MCNC: 9.6 MG/DL (ref 8.6–10.3)
CHLORIDE SERPL-SCNC: 106 MMOL/L (ref 98–110)
CHOLEST SERPL-MCNC: 85 MG/DL
CHOLEST/HDLC SERPL: 2.9 (CALC)
CO2 SERPL-SCNC: 25 MMOL/L (ref 20–32)
CREAT SERPL-MCNC: 0.85 MG/DL (ref 0.7–1.28)
CREAT UR-MCNC: 189 MG/DL (ref 20–320)
EGFRCR SERPLBLD CKD-EPI 2021: 93 ML/MIN/1.73M2
EOSINOPHIL # BLD AUTO: 297 CELLS/UL (ref 15–500)
EOSINOPHIL NFR BLD AUTO: 2.8 %
ERYTHROCYTE [DISTWIDTH] IN BLOOD BY AUTOMATED COUNT: 13.4 % (ref 11–15)
EST. AVERAGE GLUCOSE BLD GHB EST-MCNC: 146 MG/DL
EST. AVERAGE GLUCOSE BLD GHB EST-SCNC: 8.1 MMOL/L
GLUCOSE SERPL-MCNC: 126 MG/DL (ref 65–139)
HBA1C MFR BLD: 6.7 %
HCT VFR BLD AUTO: 49.1 % (ref 38.5–50)
HDLC SERPL-MCNC: 29 MG/DL
HGB BLD-MCNC: 16.4 G/DL (ref 13.2–17.1)
LDLC SERPL CALC-MCNC: 33 MG/DL (CALC)
LYMPHOCYTES # BLD AUTO: 3148 CELLS/UL (ref 850–3900)
LYMPHOCYTES NFR BLD AUTO: 29.7 %
MAGNESIUM SERPL-MCNC: 1.8 MG/DL (ref 1.5–2.5)
MCH RBC QN AUTO: 33.5 PG (ref 27–33)
MCHC RBC AUTO-ENTMCNC: 33.4 G/DL (ref 32–36)
MCV RBC AUTO: 100.2 FL (ref 80–100)
MICROALBUMIN UR-MCNC: 11.6 MG/DL
MONOCYTES # BLD AUTO: 912 CELLS/UL (ref 200–950)
MONOCYTES NFR BLD AUTO: 8.6 %
NEUTROPHILS # BLD AUTO: 6159 CELLS/UL (ref 1500–7800)
NEUTROPHILS NFR BLD AUTO: 58.1 %
NONHDLC SERPL-MCNC: 56 MG/DL (CALC)
PLATELET # BLD AUTO: 318 THOUSAND/UL (ref 140–400)
PMV BLD REES-ECKER: 10.1 FL (ref 7.5–12.5)
POTASSIUM SERPL-SCNC: 3.9 MMOL/L (ref 3.5–5.3)
PROT SERPL-MCNC: 6.5 G/DL (ref 6.1–8.1)
PSA SERPL-MCNC: 0.5 NG/ML
RBC # BLD AUTO: 4.9 MILLION/UL (ref 4.2–5.8)
SODIUM SERPL-SCNC: 137 MMOL/L (ref 135–146)
TRIGL SERPL-MCNC: 157 MG/DL
TSH SERPL-ACNC: 2.89 MIU/L (ref 0.4–4.5)
URATE SERPL-MCNC: 4.2 MG/DL (ref 4–8)
WBC # BLD AUTO: 10.6 THOUSAND/UL (ref 3.8–10.8)

## 2025-07-31 NOTE — TELEPHONE ENCOUNTER
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

## 2025-08-28 DIAGNOSIS — E78.5 HYPERLIPIDEMIA, UNSPECIFIED HYPERLIPIDEMIA TYPE: ICD-10-CM

## 2025-08-28 RX ORDER — ROSUVASTATIN CALCIUM 40 MG/1
40 TABLET, COATED ORAL DAILY
Qty: 90 TABLET | Refills: 3 | Status: SHIPPED | OUTPATIENT
Start: 2025-08-28

## 2025-09-09 ENCOUNTER — APPOINTMENT (OUTPATIENT)
Dept: PRIMARY CARE | Facility: CLINIC | Age: 70
End: 2025-09-09
Payer: COMMERCIAL

## 2025-09-29 ENCOUNTER — APPOINTMENT (OUTPATIENT)
Dept: NEUROSURGERY | Facility: CLINIC | Age: 70
End: 2025-09-29
Payer: COMMERCIAL